# Patient Record
Sex: FEMALE | Race: WHITE | NOT HISPANIC OR LATINO | Employment: FULL TIME | URBAN - METROPOLITAN AREA
[De-identification: names, ages, dates, MRNs, and addresses within clinical notes are randomized per-mention and may not be internally consistent; named-entity substitution may affect disease eponyms.]

---

## 2023-03-09 PROBLEM — E66.811 CLASS 1 OBESITY DUE TO EXCESS CALORIES WITH SERIOUS COMORBIDITY AND BODY MASS INDEX (BMI) OF 34.0 TO 34.9 IN ADULT: Status: ACTIVE | Noted: 2023-03-09

## 2024-04-15 DIAGNOSIS — I10 ESSENTIAL HYPERTENSION: ICD-10-CM

## 2024-04-15 RX ORDER — DILTIAZEM HYDROCHLORIDE 240 MG/1
240 CAPSULE, EXTENDED RELEASE ORAL DAILY
Qty: 90 CAPSULE | Refills: 3 | OUTPATIENT
Start: 2024-04-15

## 2024-04-24 ENCOUNTER — OFFICE VISIT (OUTPATIENT)
Dept: FAMILY MEDICINE CLINIC | Facility: CLINIC | Age: 54
End: 2024-04-24
Payer: COMMERCIAL

## 2024-04-24 VITALS
SYSTOLIC BLOOD PRESSURE: 164 MMHG | BODY MASS INDEX: 33.49 KG/M2 | HEART RATE: 85 BPM | HEIGHT: 64 IN | TEMPERATURE: 96.6 F | RESPIRATION RATE: 16 BRPM | WEIGHT: 196.2 LBS | OXYGEN SATURATION: 97 % | DIASTOLIC BLOOD PRESSURE: 72 MMHG

## 2024-04-24 DIAGNOSIS — Z12.31 ENCOUNTER FOR SCREENING MAMMOGRAM FOR MALIGNANT NEOPLASM OF BREAST: ICD-10-CM

## 2024-04-24 DIAGNOSIS — E55.9 VITAMIN D DEFICIENCY: ICD-10-CM

## 2024-04-24 DIAGNOSIS — F51.01 PRIMARY INSOMNIA: ICD-10-CM

## 2024-04-24 DIAGNOSIS — Z87.891 PERSONAL HISTORY OF TOBACCO USE, PRESENTING HAZARDS TO HEALTH: ICD-10-CM

## 2024-04-24 DIAGNOSIS — I10 ESSENTIAL HYPERTENSION: Primary | ICD-10-CM

## 2024-04-24 DIAGNOSIS — R73.03 PREDIABETES: ICD-10-CM

## 2024-04-24 DIAGNOSIS — K21.9 GASTROESOPHAGEAL REFLUX DISEASE: ICD-10-CM

## 2024-04-24 DIAGNOSIS — E78.2 MIXED HYPERLIPIDEMIA: ICD-10-CM

## 2024-04-24 PROCEDURE — 99214 OFFICE O/P EST MOD 30 MIN: CPT | Performed by: FAMILY MEDICINE

## 2024-04-24 RX ORDER — DEXLANSOPRAZOLE 60 MG/1
1 CAPSULE, DELAYED RELEASE ORAL DAILY
Qty: 90 CAPSULE | Refills: 0 | Status: SHIPPED | OUTPATIENT
Start: 2024-04-24

## 2024-04-24 RX ORDER — DILTIAZEM HYDROCHLORIDE 240 MG/1
240 CAPSULE, EXTENDED RELEASE ORAL DAILY
Qty: 90 CAPSULE | Refills: 0 | Status: SHIPPED | OUTPATIENT
Start: 2024-04-24

## 2024-04-24 RX ORDER — ALBUTEROL SULFATE 90 UG/1
2 AEROSOL, METERED RESPIRATORY (INHALATION) EVERY 6 HOURS PRN
Qty: 54 G | Refills: 0 | Status: SHIPPED | OUTPATIENT
Start: 2024-04-24

## 2024-04-24 RX ORDER — PRAVASTATIN SODIUM 40 MG
40 TABLET ORAL DAILY
Qty: 90 TABLET | Refills: 0 | Status: SHIPPED | OUTPATIENT
Start: 2024-04-24

## 2024-04-24 RX ORDER — MIRTAZAPINE 45 MG/1
45 TABLET, FILM COATED ORAL
Qty: 90 TABLET | Refills: 0 | Status: SHIPPED | OUTPATIENT
Start: 2024-04-24

## 2024-04-24 NOTE — PROGRESS NOTES
Chief Complaint  Med Refill    Subjective          Reema Choe presents to [facility]  History of Present Illness    Right carpal tunnel   Pain management is planning to inject to relieve symptoms.   Chronic back pain improved since getting pain pump implant.     Mammogram is overdue  She is interested in lung cancer screening    Blood pressure is elevated, but she reports normal readings at home.  She has been without Entresto stating that pharmacy does not have refills.  Reviewing her records, Dr. Naylor did approve 1 year supply in February 2024.    The following portions of the patient's history were reviewed and updated as appropriate: allergies, current medications, past family history, past medical history, past social history, past surgical history, and problem list.    Objective      Physical Exam  Vitals reviewed.   Cardiovascular:      Rate and Rhythm: Normal rate.      Heart sounds: Normal heart sounds.   Pulmonary:      Effort: Pulmonary effort is normal.      Breath sounds: Normal breath sounds.   Neurological:      Mental Status: She is alert.        Result Review :                Assessment and Plan    Diagnoses and all orders for this visit:    1. Essential hypertension (Primary)  Comments:  Uncontrolled, but she reports normal home blood pressure readings.  Orders:  -     dilTIAZem XR (Dilt-XR) 240 MG 24 hr capsule; Take 1 capsule by mouth Daily.  Dispense: 90 capsule; Refill: 0  -     Comprehensive Metabolic Panel; Future  -     CBC & Differential; Future  -     Hemoglobin A1c; Future  -     Lipid Panel With / Chol / HDL Ratio; Future  -     TSH; Future  -     Vitamin D,25-Hydroxy; Future    2. Prediabetes  -     CBC & Differential; Future  -     Hemoglobin A1c; Future  -     Lipid Panel With / Chol / HDL Ratio; Future  -     TSH; Future  -     Vitamin D,25-Hydroxy; Future    3. Gastroesophageal reflux disease  Comments:  Chronic condition, has seen  gastroenterology for evaluation.  Continue Dexilant  Orders:  -     dexlansoprazole (Dexilant) 60 MG capsule; Take 1 capsule by mouth Daily.  Dispense: 90 capsule; Refill: 0  -     Comprehensive Metabolic Panel; Future  -     CBC & Differential; Future  -     Hemoglobin A1c; Future  -     Lipid Panel With / Chol / HDL Ratio; Future  -     TSH; Future  -     Vitamin D,25-Hydroxy; Future    4. Primary insomnia  Comments:  Continue mirtazapine.  Orders:  -     mirtazapine (REMERON) 45 MG tablet; Take 1 tablet by mouth every night at bedtime.  Dispense: 90 tablet; Refill: 0  -     Comprehensive Metabolic Panel; Future  -     CBC & Differential; Future  -     Hemoglobin A1c; Future  -     Lipid Panel With / Chol / HDL Ratio; Future  -     TSH; Future  -     Vitamin D,25-Hydroxy; Future    5. Mixed hyperlipidemia  -     pravastatin (PRAVACHOL) 40 MG tablet; Take 1 tablet by mouth Daily.  Dispense: 90 tablet; Refill: 0  -     Comprehensive Metabolic Panel; Future  -     CBC & Differential; Future  -     Hemoglobin A1c; Future  -     Lipid Panel With / Chol / HDL Ratio; Future  -     TSH; Future  -     Vitamin D,25-Hydroxy; Future    6. Vitamin D deficiency  -     Vitamin D,25-Hydroxy; Future    7. Personal history of tobacco use, presenting hazards to health  -     CT Chest Low Dose Wo; Future    8. Encounter for screening mammogram for malignant neoplasm of breast  -     Mammo Screening Digital Tomosynthesis Bilateral With CAD; Future    Other orders  -     albuterol sulfate  (90 Base) MCG/ACT inhaler; Inhale 2 puffs Every 6 (Six) Hours As Needed for Wheezing.  Dispense: 54 g; Refill: 0    Return to complete fasting labs.    Follow Up   Return in about 1 year (around 4/24/2025) for Annual.  Patient was given instructions and counseling regarding her condition or for health maintenance advice. Please see specific information pulled into the AVS if appropriate.

## 2024-04-29 ENCOUNTER — PRIOR AUTHORIZATION (OUTPATIENT)
Dept: FAMILY MEDICINE CLINIC | Facility: CLINIC | Age: 54
End: 2024-04-29
Payer: COMMERCIAL

## 2024-04-29 ENCOUNTER — TELEPHONE (OUTPATIENT)
Dept: FAMILY MEDICINE CLINIC | Facility: CLINIC | Age: 54
End: 2024-04-29
Payer: COMMERCIAL

## 2024-04-29 NOTE — TELEPHONE ENCOUNTER
Caller: [facility] - [place], [place] - [address] - [phone] [facility] [phone]     Relationship: Pharmacy    Which medication are you concerned about: CALLED STATED THAT RX    dexlansoprazole (Dexilant) 60 MG capsule   HAS BEEN DISCONTINUED THERE ARE OTHER COVERED ALTERNATIVES, STATED THAT THEY FAXED OVER ALTERNATIVES      CALL BACK: [phone]  REFERENCE [id number]

## 2024-04-29 NOTE — TELEPHONE ENCOUNTER
She has failed Protonix, Omeprazole based on medication history in her chart. Possibly others, will have to contact her.

## 2024-04-30 RX ORDER — SACUBITRIL AND VALSARTAN 49; 51 MG/1; MG/1
1 TABLET, FILM COATED ORAL 2 TIMES DAILY
Qty: 60 TABLET | Refills: 0 | Status: SHIPPED | OUTPATIENT
Start: 2024-04-30 | End: 2024-05-01 | Stop reason: SDUPTHER

## 2024-05-01 RX ORDER — SACUBITRIL AND VALSARTAN 49; 51 MG/1; MG/1
1 TABLET, FILM COATED ORAL 2 TIMES DAILY
Qty: 180 TABLET | Refills: 0 | Status: SHIPPED | OUTPATIENT
Start: 2024-05-01

## 2024-06-13 DIAGNOSIS — K21.9 GASTROESOPHAGEAL REFLUX DISEASE: ICD-10-CM

## 2024-06-13 DIAGNOSIS — B00.1 HERPES LABIALIS: ICD-10-CM

## 2024-06-13 RX ORDER — DEXLANSOPRAZOLE 60 MG/1
1 CAPSULE, DELAYED RELEASE ORAL DAILY
Qty: 90 CAPSULE | Refills: 0 | OUTPATIENT
Start: 2024-06-13

## 2024-06-13 RX ORDER — VALACYCLOVIR HYDROCHLORIDE 500 MG/1
500 TABLET, FILM COATED ORAL DAILY
Qty: 90 TABLET | Refills: 3 | Status: SHIPPED | OUTPATIENT
Start: 2024-06-13

## 2024-07-05 DIAGNOSIS — F51.01 PRIMARY INSOMNIA: ICD-10-CM

## 2024-07-05 RX ORDER — MIRTAZAPINE 45 MG/1
45 TABLET, FILM COATED ORAL
Qty: 90 TABLET | Refills: 3 | Status: SHIPPED | OUTPATIENT
Start: 2024-07-05

## 2024-07-06 DIAGNOSIS — F41.9 ANXIETY: ICD-10-CM

## 2024-07-08 DIAGNOSIS — F41.9 ANXIETY: ICD-10-CM

## 2024-07-08 RX ORDER — CITALOPRAM 40 MG/1
40 TABLET ORAL DAILY
Qty: 90 TABLET | Refills: 3 | Status: SHIPPED | OUTPATIENT
Start: 2024-07-08 | End: 2024-07-08 | Stop reason: SDUPTHER

## 2024-07-08 RX ORDER — CITALOPRAM 40 MG/1
40 TABLET ORAL DAILY
Qty: 30 TABLET | Refills: 0 | Status: SHIPPED | OUTPATIENT
Start: 2024-07-08

## 2024-07-10 DIAGNOSIS — E78.2 MIXED HYPERLIPIDEMIA: ICD-10-CM

## 2024-07-12 RX ORDER — PRAVASTATIN SODIUM 40 MG
40 TABLET ORAL DAILY
Qty: 30 TABLET | Refills: 0 | Status: SHIPPED | OUTPATIENT
Start: 2024-07-12

## 2024-07-15 DIAGNOSIS — I10 ESSENTIAL HYPERTENSION: ICD-10-CM

## 2024-07-17 RX ORDER — DILTIAZEM HYDROCHLORIDE 240 MG/1
240 CAPSULE, EXTENDED RELEASE ORAL DAILY
Qty: 30 CAPSULE | Refills: 0 | Status: SHIPPED | OUTPATIENT
Start: 2024-07-17

## 2024-07-25 ENCOUNTER — LAB (OUTPATIENT)
Dept: FAMILY MEDICINE CLINIC | Facility: CLINIC | Age: 54
End: 2024-07-25
Payer: COMMERCIAL

## 2024-08-01 DIAGNOSIS — D64.9 ANEMIA, UNSPECIFIED TYPE: Primary | ICD-10-CM

## 2024-08-02 DIAGNOSIS — F41.9 ANXIETY: ICD-10-CM

## 2024-08-02 DIAGNOSIS — E78.2 MIXED HYPERLIPIDEMIA: ICD-10-CM

## 2024-08-02 DIAGNOSIS — I10 ESSENTIAL HYPERTENSION: ICD-10-CM

## 2024-08-02 DIAGNOSIS — K21.9 GASTROESOPHAGEAL REFLUX DISEASE: ICD-10-CM

## 2024-08-02 RX ORDER — PRAVASTATIN SODIUM 40 MG
40 TABLET ORAL DAILY
Qty: 30 TABLET | Refills: 0 | Status: SHIPPED | OUTPATIENT
Start: 2024-08-02

## 2024-08-02 RX ORDER — CITALOPRAM 40 MG/1
40 TABLET ORAL DAILY
Qty: 30 TABLET | Refills: 0 | Status: SHIPPED | OUTPATIENT
Start: 2024-08-02

## 2024-08-02 RX ORDER — DILTIAZEM HYDROCHLORIDE 240 MG/1
240 CAPSULE, EXTENDED RELEASE ORAL DAILY
Qty: 30 CAPSULE | Refills: 0 | Status: SHIPPED | OUTPATIENT
Start: 2024-08-02

## 2024-08-02 RX ORDER — ALBUTEROL SULFATE 90 UG/1
2 AEROSOL, METERED RESPIRATORY (INHALATION) EVERY 6 HOURS PRN
Qty: 54 G | Refills: 0 | Status: SHIPPED | OUTPATIENT
Start: 2024-08-02

## 2024-08-02 RX ORDER — DEXLANSOPRAZOLE 60 MG/1
1 CAPSULE, DELAYED RELEASE ORAL DAILY
Qty: 90 CAPSULE | Refills: 0 | Status: SHIPPED | OUTPATIENT
Start: 2024-08-02

## 2024-08-09 ENCOUNTER — TELEPHONE (OUTPATIENT)
Dept: CARDIOLOGY | Facility: CLINIC | Age: 54
End: 2024-08-09
Payer: COMMERCIAL

## 2024-08-09 NOTE — TELEPHONE ENCOUNTER
Hub staff attempted to follow warm transfer process and was unsuccessful     Caller: Reema Garner    Relationship to patient: Self    Best call back number: [phone]     Patient is needing: LAST SEEN 8/25/22, PN STATES Return in about 1 year (around 8/25/2023).    ANKLES SWELLING, CHEST HEAVINESS   BOTH ACTIVE     APPROX 1 WEEK     UNABLE TO WT CALL TO CLINICAL LINE, PLEASE ADVISE. PT WOULD LIKE AN APPT AS WELL.

## 2024-08-09 NOTE — TELEPHONE ENCOUNTER
Spoke with pt she is following up with her pcp in two weeks to address her anemia , pt instructed to go to the ER is she experiences increased  soa. Verbalized understanding .  notified to schedule follow up appointment. Last seen 8/22 . No further concerns at this time

## 2024-08-14 ENCOUNTER — LAB (OUTPATIENT)
Dept: FAMILY MEDICINE CLINIC | Facility: CLINIC | Age: 54
End: 2024-08-14
Payer: COMMERCIAL

## 2024-08-14 ENCOUNTER — OFFICE VISIT (OUTPATIENT)
Dept: CARDIOLOGY | Facility: CLINIC | Age: 54
End: 2024-08-14
Payer: COMMERCIAL

## 2024-08-14 VITALS
HEART RATE: 86 BPM | DIASTOLIC BLOOD PRESSURE: 68 MMHG | OXYGEN SATURATION: 95 % | WEIGHT: 218.8 LBS | SYSTOLIC BLOOD PRESSURE: 126 MMHG | BODY MASS INDEX: 38.77 KG/M2 | HEIGHT: 63 IN

## 2024-08-14 DIAGNOSIS — I10 PRIMARY HYPERTENSION: ICD-10-CM

## 2024-08-14 DIAGNOSIS — D64.9 ANEMIA, UNSPECIFIED TYPE: ICD-10-CM

## 2024-08-14 DIAGNOSIS — E78.2 MIXED HYPERLIPIDEMIA: ICD-10-CM

## 2024-08-14 DIAGNOSIS — R06.09 DOE (DYSPNEA ON EXERTION): Primary | ICD-10-CM

## 2024-08-14 PROCEDURE — 99214 OFFICE O/P EST MOD 30 MIN: CPT | Performed by: INTERNAL MEDICINE

## 2024-08-14 RX ORDER — ROSUVASTATIN CALCIUM 20 MG/1
20 TABLET, COATED ORAL DAILY
Qty: 90 TABLET | Refills: 3 | Status: SHIPPED | OUTPATIENT
Start: 2024-08-14

## 2024-08-14 RX ORDER — SACUBITRIL AND VALSARTAN 49; 51 MG/1; MG/1
1 TABLET, FILM COATED ORAL 2 TIMES DAILY
Qty: 180 TABLET | Refills: 0 | Status: SHIPPED | OUTPATIENT
Start: 2024-08-14

## 2024-08-14 RX ORDER — ASPIRIN 81 MG/1
81 TABLET ORAL DAILY
COMMUNITY

## 2024-08-14 NOTE — PROGRESS NOTES
[facility]  Consultation H&P  Reema Gasca [facility]  1970  [address]  [place] [address]     VISIT DATE:  08/14/24    PCP: Guerline Alarcon  [address]  [place] [address]    IDENTIFICATION: A 54 y.o.  female work from home     PROBLEM LIST:  Chest pain  7/22 Ex/Card wnl w breast attn EF 70% no coronary calcium  VHD  7/1/19 echo: EF 65%, LVH, LA borderline dilated, mild AI, MAC mild MR, mild TR, RVSP normal  5/22 OSH echo MOD AI EF 60% IR  HLD, on pravastatin  2024 ldl 135-off statin  SVT  ~2013 SVT ablation No recurrences   Former tobacco abuse  Cessation 2016, now vapes 3 mg  Migraines  Hypothyroidism  ARF 6/2019  Hospitalization [facility] Cr 3.2, came down to 1.4  Hx of phenfin use for 18 months  Chronic back pain  Fibromyalgia  OCD/anxiety  GERD  Surgical history:  Hysterectomy  HH repair  Cholecystectomy  Tubal ligation       CC:  Chief Complaint   Patient presents with    Chest Pain     SWOLLEN FEET       Allergies  Allergies   Allergen Reactions    Keflex [Cephalexin] Anaphylaxis       Current Medications    Current Outpatient Medications:     albuterol sulfate  (90 Base) MCG/ACT inhaler, Inhale 2 puffs Every 6 (Six) Hours As Needed for Wheezing., Disp: 54 g, Rfl: 0    aspirin 81 MG EC tablet, Take 1 tablet by mouth Daily., Disp: , Rfl:     citalopram (CeleXA) 40 MG tablet, Take 1 tablet by mouth Daily., Disp: 30 tablet, Rfl: 0    dexlansoprazole (Dexilant) 60 MG capsule, Take 1 capsule by mouth Daily., Disp: 90 capsule, Rfl: 0    dilTIAZem XR (DILACOR XR) 240 MG 24 hr capsule, Take 1 capsule by mouth Daily., Disp: 30 capsule, Rfl: 0    mirtazapine (REMERON) 45 MG tablet, TAKE 1 TABLET EVERY NIGHT AT BEDTIME, Disp: 90 tablet, Rfl: 3    multivitamin with minerals (Multivitamin Adults) tablet tablet, Take 1 tablet by mouth Daily., Disp: 30 tablet, Rfl: 2    Omega-3 Fatty Acids (fish oil) 1000 MG capsule  "capsule, Take 1 capsule by mouth 2 (Two) Times a Day With Meals., Disp: , Rfl:     pain patient supplied pump, by Intrathecal route Continuous. MORPHINE PUMP, Disp: , Rfl:     promethazine (PHENERGAN) 25 MG tablet, Take 0.5-1 tablets by mouth Every 6 (Six) Hours As Needed for Nausea or Vomiting., Disp: 30 tablet, Rfl: 0    sacubitril-valsartan (Entresto) 49-51 MG tablet, Take 1 tablet by mouth 2 (Two) Times a Day., Disp: 180 tablet, Rfl: 0    valACYclovir (VALTREX) 500 MG tablet, Take 1 tablet by mouth Daily., Disp: 90 tablet, Rfl: 3    rosuvastatin (CRESTOR) 20 MG tablet, Take 1 tablet by mouth Daily., Disp: 90 tablet, Rfl: 3    Current Facility-Administered Medications:     cyanocobalamin injection 1,000 mcg, 1,000 mcg, Intramuscular, Q28 Days, Kendra Funes APRN, 1,000 mcg at 09/07/23 1411     History of Present Illness   HPI  Reema Choe is a 54 y.o. year old female with the above mentioned PMH who presents for fu.   She notes chronic chest pressure states she is retaining fluid.  Most recently she was noted anemic as well and she had stopped taking her pravastatin and states that the pill is too chalky and she cannot swallow      Vitals:    08/14/24 1440   BP: 126/68   BP Location: Right arm   Patient Position: Sitting   Pulse: 86   SpO2: 95%   Weight: 99.2 kg (218 lb 12.8 oz)   Height: 160 cm (63\")       Body mass index is 38.76 kg/m².     PHYSICAL EXAMINATION:  Constitutional:       Appearance: Healthy appearance. Not in distress.   Neck:      Vascular: No JVR. JVD normal.   Pulmonary:      Effort: Pulmonary effort is normal.      Breath sounds: Normal breath sounds. No wheezing. No rhonchi. No rales.   Chest:      Chest wall: Not tender to palpatation.   Cardiovascular:      PMI at left midclavicular line. Normal rate. Regular rhythm. Normal S1. Normal S2.       Murmurs: There is a diastolic murmur.      No gallop.  No click. No rub.   Pulses:     Intact distal pulses.   Edema:     Peripheral " edema absent.   Abdominal:      General: Bowel sounds are normal.      Palpations: Abdomen is soft.      Tenderness: There is no abdominal tenderness.   Musculoskeletal: Normal range of motion.         General: No tenderness. Skin:     General: Skin is warm and dry.   Neurological:      General: No focal deficit present.      Mental Status: Alert and oriented to person, place and time.         Diagnostic Data:  Procedures  Lab Results   Component Value Date    CHLPL 216 (H) 07/25/2024    TRIG 148 07/25/2024    HDL 52 07/25/2024     Lab Results   Component Value Date    GLUCOSE 110 (H) 07/25/2024    BUN 15 07/25/2024    CREATININE 0.79 07/25/2024     07/25/2024    K 4.5 07/25/2024     07/25/2024    CO2 32.3 (H) 07/25/2024     Lab Results   Component Value Date    HGBA1C 6.00 (H) 07/25/2024     Lab Results   Component Value Date    WBC 6.30 07/25/2024    HGB 10.2 (L) 07/25/2024    HCT 33.3 (L) 07/25/2024     07/25/2024       ASSESSMENT:   Diagnosis Plan   1. GUPTA (dyspnea on exertion)  Adult Transthoracic Echo Complete W/ Cont if Necessary Per Protocol            PLAN:  Aortic insufficiency will recheck echocardiogram with worsening dyspnea    Hypertension uncontrolled we will continue Entresto to 48/52 formulation with concomitant diltiazem    Mixed dyslipidemia transition statin to rosuvastatin 20    Nicotine cessation counseling avoidance of nicotine products recommended        No ref. provider found, thank you for referring Ms. Choe for evaluation.  I have forwarded my electronically generated recommendations to you for review.  Please do not hesitate to call with any questions.      Elbert Naylor MD, [facility]

## 2024-08-20 ENCOUNTER — HOSPITAL ENCOUNTER (OUTPATIENT)
Dept: CARDIOLOGY | Facility: HOSPITAL | Age: 54
Discharge: HOME | End: 2024-08-20
Admitting: INTERNAL MEDICINE
Payer: COMMERCIAL

## 2024-08-20 VITALS — BODY MASS INDEX: 38.62 KG/M2 | WEIGHT: 218 LBS | HEIGHT: 63 IN

## 2024-08-20 DIAGNOSIS — R06.09 DOE (DYSPNEA ON EXERTION): ICD-10-CM

## 2024-08-20 PROCEDURE — 93306 TTE W/DOPPLER COMPLETE: CPT

## 2024-08-21 ENCOUNTER — OFFICE VISIT (OUTPATIENT)
Dept: BARIATRICS/WEIGHT MGMT | Facility: CLINIC | Age: 54
End: 2024-08-21
Payer: COMMERCIAL

## 2024-08-21 ENCOUNTER — TELEPHONE (OUTPATIENT)
Dept: CARDIOLOGY | Facility: CLINIC | Age: 54
End: 2024-08-21
Payer: COMMERCIAL

## 2024-08-21 VITALS
HEART RATE: 82 BPM | BODY MASS INDEX: 37.05 KG/M2 | DIASTOLIC BLOOD PRESSURE: 74 MMHG | HEIGHT: 64 IN | SYSTOLIC BLOOD PRESSURE: 124 MMHG | WEIGHT: 217 LBS

## 2024-08-21 DIAGNOSIS — F41.9 ANXIETY: ICD-10-CM

## 2024-08-21 DIAGNOSIS — E55.9 VITAMIN D INSUFFICIENCY: ICD-10-CM

## 2024-08-21 DIAGNOSIS — R73.03 PREDIABETES: ICD-10-CM

## 2024-08-21 DIAGNOSIS — E66.09 CLASS 1 OBESITY DUE TO EXCESS CALORIES WITH SERIOUS COMORBIDITY AND BODY MASS INDEX (BMI) OF 34.0 TO 34.9 IN ADULT: Primary | ICD-10-CM

## 2024-08-21 DIAGNOSIS — E78.2 MIXED HYPERLIPIDEMIA: ICD-10-CM

## 2024-08-21 DIAGNOSIS — F33.2 SEVERE EPISODE OF RECURRENT MAJOR DEPRESSIVE DISORDER, WITHOUT PSYCHOTIC FEATURES: ICD-10-CM

## 2024-08-21 PROBLEM — M70.62 TROCHANTERIC BURSITIS OF LEFT HIP: Status: ACTIVE | Noted: 2022-10-25

## 2024-08-21 PROBLEM — M46.1 INFLAMMATION OF SACROILIAC JOINT: Status: RESOLVED | Noted: 2023-06-29 | Resolved: 2024-08-21

## 2024-08-21 PROBLEM — M46.1 INFLAMMATION OF SACROILIAC JOINT: Status: ACTIVE | Noted: 2023-06-29

## 2024-08-21 PROBLEM — M47.812 CERVICAL SPONDYLOSIS WITHOUT MYELOPATHY: Status: ACTIVE | Noted: 2022-10-25

## 2024-08-21 PROBLEM — M70.61 TROCHANTERIC BURSITIS OF RIGHT HIP: Status: ACTIVE | Noted: 2023-05-18

## 2024-08-21 PROBLEM — M70.61 TROCHANTERIC BURSITIS OF RIGHT HIP: Status: RESOLVED | Noted: 2023-05-18 | Resolved: 2024-08-21

## 2024-08-21 PROBLEM — M70.62 TROCHANTERIC BURSITIS OF LEFT HIP: Status: RESOLVED | Noted: 2022-10-25 | Resolved: 2024-08-21

## 2024-08-21 LAB
ASCENDING AORTA: 3 CM
BH CV ECHO MEAS - AI P1/2T: 299.7 MSEC
BH CV ECHO MEAS - AO MAX PG: 14 MMHG
BH CV ECHO MEAS - AO MEAN PG: 8 MMHG
BH CV ECHO MEAS - AO ROOT DIAM: 2.7 CM
BH CV ECHO MEAS - AO V2 MAX: 189 CM/SEC
BH CV ECHO MEAS - AO V2 VTI: 39.5 CM
BH CV ECHO MEAS - AVA(I,D): 1.8 CM2
BH CV ECHO MEAS - EDV(CUBED): 62.6 ML
BH CV ECHO MEAS - EDV(MOD-SP2): 65.7 ML
BH CV ECHO MEAS - EDV(MOD-SP4): 106 ML
BH CV ECHO MEAS - EF(MOD-BP): 67.2 %
BH CV ECHO MEAS - EF(MOD-SP2): 74.1 %
BH CV ECHO MEAS - EF(MOD-SP4): 59.3 %
BH CV ECHO MEAS - ESV(CUBED): 16.4 ML
BH CV ECHO MEAS - ESV(MOD-SP2): 17 ML
BH CV ECHO MEAS - ESV(MOD-SP4): 43.1 ML
BH CV ECHO MEAS - FS: 36 %
BH CV ECHO MEAS - IVS/LVPW: 0.82 CM
BH CV ECHO MEAS - IVSD: 1.18 CM
BH CV ECHO MEAS - LA DIMENSION: 5.3 CM
BH CV ECHO MEAS - LAT PEAK E' VEL: 6 CM/SEC
BH CV ECHO MEAS - LV DIASTOLIC VOL/BSA (35-75): 52.8 CM2
BH CV ECHO MEAS - LV MASS(C)D: 186.3 GRAMS
BH CV ECHO MEAS - LV MAX PG: 6.7 MMHG
BH CV ECHO MEAS - LV MEAN PG: 3.7 MMHG
BH CV ECHO MEAS - LV SYSTOLIC VOL/BSA (12-30): 21.5 CM2
BH CV ECHO MEAS - LV V1 MAX: 129.7 CM/SEC
BH CV ECHO MEAS - LV V1 VTI: 27.8 CM
BH CV ECHO MEAS - LVIDD: 4 CM
BH CV ECHO MEAS - LVIDS: 2.5 CM
BH CV ECHO MEAS - LVOT AREA: 2.5 CM2
BH CV ECHO MEAS - LVOT DIAM: 1.8 CM
BH CV ECHO MEAS - LVPWD: 1.44 CM
BH CV ECHO MEAS - MED PEAK E' VEL: 5 CM/SEC
BH CV ECHO MEAS - MV A MAX VEL: 165.5 CM/SEC
BH CV ECHO MEAS - MV DEC SLOPE: 464.7 CM/SEC2
BH CV ECHO MEAS - MV DEC TIME: 0.42 SEC
BH CV ECHO MEAS - MV E MAX VEL: 145.8 CM/SEC
BH CV ECHO MEAS - MV E/A: 0.88
BH CV ECHO MEAS - MV MAX PG: 14.9 MMHG
BH CV ECHO MEAS - MV MEAN PG: 7.1 MMHG
BH CV ECHO MEAS - MV P1/2T: 106.7 MSEC
BH CV ECHO MEAS - MV V2 VTI: 60.9 CM
BH CV ECHO MEAS - MVA(P1/2T): 2.06 CM2
BH CV ECHO MEAS - MVA(VTI): 1.16 CM2
BH CV ECHO MEAS - PA ACC TIME: 0.1 SEC
BH CV ECHO MEAS - PA V2 MAX: 129.5 CM/SEC
BH CV ECHO MEAS - PAPD(PI EDV): 5 MMHG
BH CV ECHO MEAS - PI END-D VEL: 110.5 CM/SEC
BH CV ECHO MEAS - RAP SYSTOLE: 8 MMHG
BH CV ECHO MEAS - RVSP: 37 MMHG
BH CV ECHO MEAS - SV(LVOT): 70.4 ML
BH CV ECHO MEAS - SV(MOD-SP2): 48.7 ML
BH CV ECHO MEAS - SV(MOD-SP4): 62.9 ML
BH CV ECHO MEAS - SVI(LVOT): 35.1 ML/M2
BH CV ECHO MEAS - SVI(MOD-SP2): 24.3 ML/M2
BH CV ECHO MEAS - SVI(MOD-SP4): 31.4 ML/M2
BH CV ECHO MEAS - TAPSE (>1.6): 2.33 CM
BH CV ECHO MEAS - TR MAX PG: 29 MMHG
BH CV ECHO MEAS - TR MAX VEL: 269 CM/SEC
BH CV ECHO MEASUREMENTS AVERAGE E/E' RATIO: 26.51
BH CV VAS BP LEFT ARM: NORMAL MMHG
BH CV XLRA - RV BASE: 4.5 CM
BH CV XLRA - RV LENGTH: 7.1 CM
BH CV XLRA - RV MID: 3.5 CM
BH CV XLRA - TDI S': 16.6 CM/SEC
IVRT: 58 MS
LEFT ATRIUM VOLUME INDEX: 34.4 ML/M2
LV EF 2D ECHO EST: 67 %

## 2024-08-21 RX ORDER — SEMAGLUTIDE 0.5 MG/.5ML
0.5 INJECTION, SOLUTION SUBCUTANEOUS WEEKLY
Qty: 2 ML | Refills: 0 | Status: SHIPPED | OUTPATIENT
Start: 2024-08-21

## 2024-08-21 RX ORDER — VENLAFAXINE HYDROCHLORIDE 37.5 MG/1
37.5 CAPSULE, EXTENDED RELEASE ORAL DAILY
Qty: 30 CAPSULE | Refills: 2 | Status: SHIPPED | OUTPATIENT
Start: 2024-08-21

## 2024-08-21 RX ORDER — ERGOCALCIFEROL 1.25 MG/1
50000 CAPSULE ORAL WEEKLY
Qty: 12 CAPSULE | Refills: 0 | Status: SHIPPED | OUTPATIENT
Start: 2024-08-21

## 2024-08-21 NOTE — PROGRESS NOTES
[facility]  [address]  [place] [address]     Office Note      Date: 2024  Patient Name: Reema Garner  MRN: [id number]  : 1970  Subjective  Subjective     Chief Complaint  Obesity Management follow-up and weight regain           Reema Garner presents to [facility] for obesity management. she is here to restart the weight loss program after an absence of 11 months. Current medications and medical history updated.     Appetite is poorly controlled.     Current lifestyle  The patient is not using a food journal.    24 hour recall:  6 sprites  1 bag of chips  B: sprite, 10 powdered donuts or 1 bagel with honey butter  S: chips  L: none  D: chicken or meatloaf - cooking 3 nights a week and the rest is eating out  Shopping and planning on Sundays  1-2 nights a week dining out ([employer] or fried chicken strips from [employer]    The patient is not exercising due to pain- even cleaning the house will put her on the couch, with a FITT score of: 0.   Frequency Intensity Time Strength Training   [x]   0, none [x]   0 [x]   0 [x]   0   []   1 (1-2x/week) []   1 (light) []   1 (<10 min) []   1 (1x/week)   []   2 (3-5x/week) []   2 (moderate) []   2 (10-20 min) []   2 (2x/week)   []   3 (daily) []   3 (moderately hard)  []   4 (very hard) []   3 (20-30 min)  []   4 (>30 min) []   3 (3-4x/week)       Hours of sleep per night: 5  The following seem to sabotage weight loss efforts:Lack of time for planning & self, comfort/stress eating, enjoyment of food, skipping meals, eating late, liquid calories such as alcohol, specific cravings like carbohydrates, mindless eating (snacking while working or watching TV), eating too fast, always hungry, boredom eating, convenience food (fast food), portion sizes, too little protein, and poor sleep     Review of Systems    Objective     Restart Weight: 217  Change in  "weight since last visit: +25.2  Body mass index is 37.84 kg/m².    Body composition analysis completed and showed:   %body fat: 49.4  Total fat mass (in lbs):107.2    Measurements (in inches)  Neck: 16.5  Chest: 51  Waist: 52    PHQ-9 Total Score: 19      Vital Signs:   /74 (BP Location: Left arm, Patient Position: Sitting)   Pulse 82   Ht 161.3 cm (63.5\")   Wt 98.4 kg (217 lb)   BMI 37.84 kg/m²       General appears stated age and normal appearance   HEENT Mallampati score III or IV, enlarged neck circumference, PERRLA, EOM intact, conjunctivae normal, and without thyromegaly or thyroid nodules   Chest/lungs Normal rate, Regular rhythm, Breathing is unlabored, and Clear to auscultation bilaterally   Abdomen Protuberant, Central adiposity, and TendernessLUQ   Extremities without edema   Neuro Normal DTRs, Good historian, and No focal deficit   Skin Warm, dry, intact   Psych normal behavior, normal thought content, and normal concentration     Result Review :   The following data was reviewed by: CARLOS Queen on 08/21/2024:  Hospital Outpatient Visit on 08/20/2024   Component Date Value Ref Range Status    BH CV VAS BP LEFT ARM 08/20/2024 114/50  mmHg Final    LVIDd 08/20/2024 4.0  cm Final    LVIDs 08/20/2024 2.5  cm Final    IVSd 08/20/2024 1.18  cm Final    LVPWd 08/20/2024 1.44  cm Final    FS 08/20/2024 36.0  % Final    IVS/LVPW 08/20/2024 0.82  cm Final    ESV(cubed) 08/20/2024 16.4  ml Final    LV Sys Vol (BSA corrected) 08/20/2024 21.5  cm2 Final    EDV(cubed) 08/20/2024 62.6  ml Final    LV Ba Vol (BSA corrected) 08/20/2024 52.8  cm2 Final    LV mass(C)d 08/20/2024 186.3  grams Final    LVOT area 08/20/2024 2.5  cm2 Final    LVOT diam 08/20/2024 1.80  cm Final    EDV(MOD-sp2) 08/20/2024 65.7  ml Final    EDV(MOD-sp4) 08/20/2024 106.0  ml Final    ESV(MOD-sp2) 08/20/2024 17.0  ml Final    ESV(MOD-sp4) 08/20/2024 43.1  ml Final    SV(MOD-sp2) 08/20/2024 48.7  ml Final    SV(MOD-sp4) " 08/20/2024 62.9  ml Final    SVi(MOD-SP2) 08/20/2024 24.3  ml/m2 Final    SVi(MOD-SP4) 08/20/2024 31.4  ml/m2 Final    SVi (LVOT) 08/20/2024 35.1  ml/m2 Final    EF(MOD-sp2) 08/20/2024 74.1  % Final    EF(MOD-sp4) 08/20/2024 59.3  % Final    MV E max enmanuel 08/20/2024 145.8  cm/sec Final    MV A max enmanuel 08/20/2024 165.5  cm/sec Final    MV dec time 08/20/2024 0.42  sec Final    MV E/A 08/20/2024 0.88   Final    LA ESV Index (BP) 08/20/2024 34.4  ml/m2 Final    Med Peak E' Enmanuel 08/20/2024 5.0  cm/sec Final    Lat Peak E' Enmanuel 08/20/2024 6.0  cm/sec Final    TR max enmanuel 08/20/2024 269  cm/sec Final    Avg E/e' ratio 08/20/2024 26.51   Final    SV(LVOT) 08/20/2024 70.4  ml Final    RV Base 08/20/2024 4.5  cm Final    RV Mid 08/20/2024 3.5  cm Final    RV Length 08/20/2024 7.1  cm Final    TAPSE (>1.6) 08/20/2024 2.33  cm Final    RV S' 08/20/2024 16.6  cm/sec Final    LA dimension (2D)  08/20/2024 5.3  cm Final    LV V1 max 08/20/2024 129.7  cm/sec Final    LV V1 max PG 08/20/2024 6.7  mmHg Final    LV V1 mean PG 08/20/2024 3.7  mmHg Final    LV V1 VTI 08/20/2024 27.8  cm Final    Ao pk enmanuel 08/20/2024 189.0  cm/sec Final    Ao max PG 08/20/2024 14  mmHg Final    Ao mean PG 08/20/2024 8  mmHg Final    Ao V2 VTI 08/20/2024 39.5  cm Final    OLGA(I,D) 08/20/2024 1.8  cm2 Final    AI P1/2t 08/20/2024 299.7  msec Final    MV max PG 08/20/2024 14.9  mmHg Final    MV mean PG 08/20/2024 7.1  mmHg Final    MV V2 VTI 08/20/2024 60.9  cm Final    MV P1/2t 08/20/2024 106.7  msec Final    MVA(P1/2t) 08/20/2024 2.06  cm2 Final    MVA(VTI) 08/20/2024 1.16  cm2 Final    MV dec slope 08/20/2024 464.7  cm/sec2 Final    TR max PG 08/20/2024 29  mmHg Final    PA V2 max 08/20/2024 129.5  cm/sec Final    PA acc time 08/20/2024 0.10  sec Final    PI end-d enmanuel 08/20/2024 110.5  cm/sec Final    Ao root diam 08/20/2024 2.7  cm Final    EF(MOD-bp) 08/20/2024 67.2  % Final    Echo EF Estimated 08/20/2024 67.0  % Final    IVRT 08/20/2024 58.0  ms Final     RVSP(TR) 08/20/2024 37  mmHg Final    RAP systole 08/20/2024 8  mmHg Final    PAPd(PI edV) 08/20/2024 5  mmHg Final    Ascending aorta 08/20/2024 3.0  cm Final   Results Encounter on 04/29/2024   Component Date Value Ref Range Status    Glucose 07/25/2024 110 (H)  65 - 99 mg/dL Final    BUN 07/25/2024 15  6 - 20 mg/dL Final    Creatinine 07/25/2024 0.79  0.57 - 1.00 mg/dL Final    EGFR Result 07/25/2024 87.9  >60.0 mL/min/1.73 Final    Comment: GFR Normal >60  Chronic Kidney Disease <60  Kidney Failure <15      BUN/Creatinine Ratio 07/25/2024 19.0  7.0 - 25.0 Final    Sodium 07/25/2024 141  136 - 145 mmol/L Final    Potassium 07/25/2024 4.5  3.5 - 5.2 mmol/L Final    Chloride 07/25/2024 103  98 - 107 mmol/L Final    Total CO2 07/25/2024 32.3 (H)  22.0 - 29.0 mmol/L Final    Calcium 07/25/2024 9.6  8.6 - 10.5 mg/dL Final    Total Protein 07/25/2024 6.3  6.0 - 8.5 g/dL Final    Albumin 07/25/2024 4.2  3.5 - 5.2 g/dL Final    Globulin 07/25/2024 2.1  gm/dL Final    A/G Ratio 07/25/2024 2.0  g/dL Final    Total Bilirubin 07/25/2024 <0.2  0.0 - 1.2 mg/dL Final    Alkaline Phosphatase 07/25/2024 114  39 - 117 U/L Final    AST (SGOT) 07/25/2024 20  1 - 32 U/L Final    ALT (SGPT) 07/25/2024 17  1 - 33 U/L Final    WBC 07/25/2024 6.30  3.40 - 10.80 10*3/mm3 Final    RBC 07/25/2024 4.23  3.77 - 5.28 10*6/mm3 Final    Hemoglobin 07/25/2024 10.2 (L)  12.0 - 15.9 g/dL Final    Hematocrit 07/25/2024 33.3 (L)  34.0 - 46.6 % Final    MCV 07/25/2024 78.7 (L)  79.0 - 97.0 fL Final    MCH 07/25/2024 24.1 (L)  26.6 - 33.0 pg Final    MCHC 07/25/2024 30.6 (L)  31.5 - 35.7 g/dL Final    RDW 07/25/2024 18.2 (H)  12.3 - 15.4 % Final    Platelets 07/25/2024 235  140 - 450 10*3/mm3 Final    Neutrophil Rel % 07/25/2024 55.3  42.7 - 76.0 % Final    Lymphocyte Rel % 07/25/2024 32.4  19.6 - 45.3 % Final    Monocyte Rel % 07/25/2024 8.7  5.0 - 12.0 % Final    Eosinophil Rel % 07/25/2024 2.5  0.3 - 6.2 % Final    Basophil Rel % 07/25/2024 0.5   0.0 - 1.5 % Final    Neutrophils Absolute 07/25/2024 3.48  1.70 - 7.00 10*3/mm3 Final    Lymphocytes Absolute 07/25/2024 2.04  0.70 - 3.10 10*3/mm3 Final    Monocytes Absolute 07/25/2024 0.55  0.10 - 0.90 10*3/mm3 Final    Eosinophils Absolute 07/25/2024 0.16  0.00 - 0.40 10*3/mm3 Final    Basophils Absolute 07/25/2024 0.03  0.00 - 0.20 10*3/mm3 Final    Immature Granulocyte Rel % 07/25/2024 0.6 (H)  0.0 - 0.5 % Final    Immature Grans Absolute 07/25/2024 0.04  0.00 - 0.05 10*3/mm3 Final    nRBC 07/25/2024 0.0  0.0 - 0.2 /100 WBC Final    Hemoglobin A1C 07/25/2024 6.00 (H)  4.80 - 5.60 % Final    Comment: Hemoglobin A1C Ranges:  Increased Risk for Diabetes  5.7% to 6.4%  Diabetes                     >= 6.5%  Diabetic Goal                < 7.0%      Total Cholesterol 07/25/2024 216 (H)  0 - 200 mg/dL Final    Comment: Cholesterol Reference Ranges  (U.S. Department of Health and Human Services ATP III  Classifications)  Desirable          <200 mg/dL  Borderline High    200-239 mg/dL  High Risk          >240 mg/dL  Triglyceride Reference Ranges  (U.S. Department of Health and Human Services ATP III  Classifications)  Normal           <150 mg/dL  Borderline High  150-199 mg/dL  High             200-499 mg/dL  Very High        >500 mg/dL  HDL Reference Ranges  (U.S. Department of Health and Human Services ATP III  Classifications)  Low     <40 mg/dl (major risk factor for CHD)  High    >60 mg/dl ('negative' risk factor for CHD)  LDL Reference Ranges  (U.S. Department of Health and Human Services ATP III  Classifications)  Optimal          <100 mg/dL  Near Optimal     100-129 mg/dL  Borderline High  130-159 mg/dL  High             160-189 mg/dL  Very High        >189 mg/dL      Triglycerides 07/25/2024 148  0 - 150 mg/dL Final    HDL Cholesterol 07/25/2024 52  40 - 60 mg/dL Final    VLDL Cholesterol Christopher 07/25/2024 26  5 - 40 mg/dL Final    LDL Chol Calc ([facility]) 07/25/2024 138 (H)  0 - 100 mg/dL Final    Chol/HDL Ratio  07/25/2024 4.15   Final    TSH 07/25/2024 2.590  0.270 - 4.200 uIU/mL Final    25 Hydroxy, Vitamin D 07/25/2024 24.4 (L)  30.0 - 100.0 ng/ml Final    Comment: Reference Range for Total Vitamin D 25(OH)  Deficiency <20.0 ng/mL  Insufficiency 21-29 ng/mL  Sufficiency  ng/mL  Toxicity >100 ng/ml      Vitamin B-12 07/25/2024 384  211 - 946 pg/mL Final    Results may be falsely increased if patient taking Biotin.    Folate 07/25/2024 5.26  4.78 - 24.20 ng/mL Final    Results may be falsely increased if patient taking Biotin.    Iron 07/25/2024 46  37 - 145 mcg/dL Final    Written Authorization 07/25/2024 Comment   Final    Comment: Written Authorization Received.  Authorization received from WRITTEN REQUEST 07-  Logged by Wes Vazquez                    Assessment / Plan         Diagnoses and all orders for this visit:    1. Class 1 obesity due to excess calories with serious comorbidity and body mass index (BMI) of 34.0 to 34.9 in adult (Primary)  Overview:  Restart (8/21/24): 217lb, BMI 37.84  Goal 150lb    AOM hx:  Wegovy: unable to escalate due to shortage  Topamax: no problems, can't recall taking  Wellbutrin: irritability/agitation years ago  Consider: metformin, zepbound (unsure if on formulary)  Caution: GLP-1 due to hx of pancreatitis (had ronny)  Avoid: sympathomimetic due to hypertension and ^CV risk, hx SVT    PMH adrenal adenoma, pt cancelled endo referral.     Comorbidities: preDM, HLD, GERD, depression, SVT (s/p ablation)    Assessment & Plan:  Patient's (Body mass index is 37.84 kg/m².) indicates that they are morbidly/severely obese (BMI > 40 or > 35 with obesity - related health condition) with health conditions that include hypertension, dyslipidemias, GERD, and prediabetes  . Weight is worsening. BMI  is above average; BMI management plan is completed. We discussed low calorie, low carb based diet program, portion control, increasing exercise, management of depression/anxiety/stress to  control compensatory eating, pharmacologic options including wegovy, and an delgado-based approach such as [employer] Pal or Lose It.    I have instructed the patient to continue with pursuit of medical weight loss as a part of this program. Patient does meet criteria for use of anorectics at this time as BMI >30  and is not at treatment goal.     The current plan for this month includes:   - Start baritastic food journal. Reviewed macronutritent goals:  Alories 5566-0258  Net carbs 100-125g  Protein 84-105g  - Decrease sprite to one in the morning then water the rest of the day  - Replace chips with vegetables (already bought cucumbers and celery)  - Restart wegovy with sample today, will send next dose to the pharmacy. Was not able to escalate previously due to shortage. Start Wegovy. Denies family or personal history of MTC, or MEN 2. Hx of pancreatitis prior to ronny. Discussed common side effects of nausea, diarrhea, vomiting, constipation, stomach pain, headache, fatigue, upset stomach, dizziness, feeling bloated, belching, gas, stomach flu, heartburn.   First injection of Wegovy 0.25mg was administered in the office today LLQ, no complications. Sample pen provided to patient along with content regarding use of the pen, dosing schedule, savings opportunities, and helpful tips.   - Consider topamax once depression is better controlled. Start effexor low dose (also has hot flashes).   - Treatment goal 150lb.        Orders:  -     Semaglutide-Weight Management (Wegovy) 0.5 MG/0.5ML solution auto-injector; Inject 0.5 mL under the skin into the appropriate area as directed 1 (One) Time Per Week.  Dispense: 2 mL; Refill: 0    2. Severe episode of recurrent major depressive disorder, without psychotic features  Assessment & Plan:  Patient's depression is a recurrent episode that is severe without psychosis. Depression is active and worsening.    Plan:   Begin new antidepressant medicine; declines referral for therapy at  this time.   Continue celexa.   Increase physical activity, hobbies, time in nature    Followup at the next regular appointment.     Orders:  -     venlafaxine XR (Effexor XR) 37.5 MG 24 hr capsule; Take 1 capsule by mouth Daily.  Dispense: 30 capsule; Refill: 2    3. Anxiety  -     venlafaxine XR (Effexor XR) 37.5 MG 24 hr capsule; Take 1 capsule by mouth Daily.  Dispense: 30 capsule; Refill: 2    4. Vitamin D insufficiency  Assessment & Plan:  We discussed that adipose tissue sequesters Vitamin D and it often coexists with obesity. Treat with prescription dosing. Reassess in 3 months.         Orders:  -     vitamin D (ERGOCALCIFEROL) 1.25 MG (62049 UT) capsule capsule; Take 1 capsule by mouth 1 (One) Time Per Week.  Dispense: 12 capsule; Refill: 0    5. Prediabetes  Overview:  02/2023 A1c 6.0    Assessment & Plan:  Unchanged, A1c 6.0. Denies hypoglycemia. Continue low carb diet, regular physical activity, and weight reduction of 10-15%. Start wegovy. Consider metformin.         6. Mixed hyperlipidemia  Assessment & Plan:   Managed by cardiology.   The 10-year ASCVD risk score (Alverto DK, et al., 2019) is: 3.1%    Values used to calculate the score:      Age: 54 years      Sex: Female      Is Non- : No      Diabetic: No      Tobacco smoker: No      Systolic Blood Pressure: 124 mmHg      Is BP treated: Yes      HDL Cholesterol: 52 mg/dL      Total Cholesterol: 216 mg/dL  Reviewed healthy diet and exercise goals.           I spent 56 minutes caring for Reema on this date of service. This time includes time spent by me in the following activities:preparing for the visit, reviewing tests, performing a medically appropriate examination and/or evaluation , counseling and educating the patient/family/caregiver, ordering medications, tests, or procedures, and documenting information in the medical record  Follow Up   Return in about 1 month (around 9/21/2024) for Next scheduled follow  up.  Patient was given instructions and counseling regarding her condition or for health maintenance advice. Please see specific information pulled into the AVS if appropriate.     CARLOS Muniz

## 2024-08-21 NOTE — ASSESSMENT & PLAN NOTE
Managed by cardiology.   The 10-year ASCVD risk score (Alverto KINSEY, et al., 2019) is: 3.1%    Values used to calculate the score:      Age: 54 years      Sex: Female      Is Non- : No      Diabetic: No      Tobacco smoker: No      Systolic Blood Pressure: 124 mmHg      Is BP treated: Yes      HDL Cholesterol: 52 mg/dL      Total Cholesterol: 216 mg/dL  Reviewed healthy diet and exercise goals.

## 2024-08-21 NOTE — ASSESSMENT & PLAN NOTE
We discussed that adipose tissue sequesters Vitamin D and it often coexists with obesity. Treat with prescription dosing. Reassess in 3 months.

## 2024-08-21 NOTE — ASSESSMENT & PLAN NOTE
Presents today requesting shingles vaccine #1, tolerated well   Unchanged, A1c 6.0. Denies hypoglycemia. Continue low carb diet, regular physical activity, and weight reduction of 10-15%. Start wegovy. Consider metformin.

## 2024-08-21 NOTE — ASSESSMENT & PLAN NOTE
Patient's depression is a recurrent episode that is severe without psychosis. Depression is active and worsening.    Plan:   Begin new antidepressant medicine; declines referral for therapy at this time.   Continue celexa.   Increase physical activity, hobbies, time in nature    Followup at the next regular appointment.

## 2024-08-21 NOTE — TELEPHONE ENCOUNTER
Echo nl lvef  AV/MV valvular calcification with mild restriction    Yearly echo and cont meds      Left message with YASMEEN regarding pts retention of fluid , approx 10 lbs over past 2 months.

## 2024-08-21 NOTE — ASSESSMENT & PLAN NOTE
Patient's (Body mass index is 37.84 kg/m².) indicates that they are morbidly/severely obese (BMI > 40 or > 35 with obesity - related health condition) with health conditions that include hypertension, dyslipidemias, GERD, and prediabetes  . Weight is worsening. BMI  is above average; BMI management plan is completed. We discussed low calorie, low carb based diet program, portion control, increasing exercise, management of depression/anxiety/stress to control compensatory eating, pharmacologic options including wegovy, and an delgado-based approach such as [email or web address] Pal or Lose It.    I have instructed the patient to continue with pursuit of medical weight loss as a part of this program. Patient does meet criteria for use of anorectics at this time as BMI >30  and is not at treatment goal.     The current plan for this month includes:   - Start baritastic food journal. Reviewed macronutritent goals:  Alories 6310-2352  Net carbs 100-125g  Protein 84-105g  - Decrease sprite to one in the morning then water the rest of the day  - Replace chips with vegetables (already bought cucumbers and celery)  - Restart wegovy with sample today, will send next dose to the pharmacy. Was not able to escalate previously due to shortage. Start Wegovy. Denies family or personal history of MTC, or MEN 2. Hx of pancreatitis prior to ronny. Discussed common side effects of nausea, diarrhea, vomiting, constipation, stomach pain, headache, fatigue, upset stomach, dizziness, feeling bloated, belching, gas, stomach flu, heartburn.   First injection of Wegovy 0.25mg was administered in the office today LLQ, no complications. Sample pen provided to patient along with content regarding use of the pen, dosing schedule, savings opportunities, and helpful tips.   - Consider topamax once depression is better controlled. Start effexor low dose (also has hot flashes).   - Treatment goal 150lb.

## 2024-08-22 ENCOUNTER — TELEPHONE (OUTPATIENT)
Dept: CARDIOLOGY | Facility: CLINIC | Age: 54
End: 2024-08-22
Payer: COMMERCIAL

## 2024-08-22 NOTE — TELEPHONE ENCOUNTER
Elbert Naylor MD Jackson, Kristina, RN  Nicotine, anemia.  Needs to fu w pcp re anemia as will result in edema    Pt called , encouraged to follow up with pcp , verbalized understanding

## 2024-09-11 DIAGNOSIS — I10 ESSENTIAL HYPERTENSION: ICD-10-CM

## 2024-09-11 RX ORDER — DILTIAZEM HYDROCHLORIDE 240 MG/1
240 CAPSULE, EXTENDED RELEASE ORAL DAILY
Qty: 90 CAPSULE | Refills: 1 | Status: SHIPPED | OUTPATIENT
Start: 2024-09-11

## 2024-09-15 DIAGNOSIS — E66.09 CLASS 1 OBESITY DUE TO EXCESS CALORIES WITH SERIOUS COMORBIDITY AND BODY MASS INDEX (BMI) OF 33.0 TO 33.9 IN ADULT: ICD-10-CM

## 2024-09-16 RX ORDER — SEMAGLUTIDE 1 MG/.5ML
INJECTION, SOLUTION SUBCUTANEOUS
Qty: 2 ML | Refills: 0 | Status: SHIPPED | OUTPATIENT
Start: 2024-09-16

## 2024-09-23 ENCOUNTER — PRIOR AUTHORIZATION (OUTPATIENT)
Dept: BARIATRICS/WEIGHT MGMT | Facility: CLINIC | Age: 54
End: 2024-09-23
Payer: COMMERCIAL

## 2024-09-26 ENCOUNTER — OFFICE VISIT (OUTPATIENT)
Dept: BARIATRICS/WEIGHT MGMT | Facility: CLINIC | Age: 54
End: 2024-09-26
Payer: COMMERCIAL

## 2024-09-26 VITALS
SYSTOLIC BLOOD PRESSURE: 118 MMHG | BODY MASS INDEX: 33.9 KG/M2 | DIASTOLIC BLOOD PRESSURE: 78 MMHG | WEIGHT: 198.6 LBS | HEIGHT: 64 IN | HEART RATE: 98 BPM

## 2024-09-26 DIAGNOSIS — R73.03 PREDIABETES: ICD-10-CM

## 2024-09-26 DIAGNOSIS — F41.9 ANXIETY: ICD-10-CM

## 2024-09-26 DIAGNOSIS — R11.0 NAUSEA: ICD-10-CM

## 2024-09-26 DIAGNOSIS — E66.09 CLASS 1 OBESITY DUE TO EXCESS CALORIES WITH SERIOUS COMORBIDITY AND BODY MASS INDEX (BMI) OF 34.0 TO 34.9 IN ADULT: Primary | ICD-10-CM

## 2024-09-26 PROCEDURE — 99214 OFFICE O/P EST MOD 30 MIN: CPT | Performed by: NURSE PRACTITIONER

## 2024-09-26 RX ORDER — CITALOPRAM HYDROBROMIDE 40 MG/1
20 TABLET ORAL DAILY
Qty: 30 TABLET | Refills: 0 | Status: SHIPPED | OUTPATIENT
Start: 2024-09-26

## 2024-09-26 RX ORDER — PROMETHAZINE HYDROCHLORIDE 25 MG/1
12.5-25 TABLET ORAL EVERY 6 HOURS PRN
Qty: 30 TABLET | Refills: 0 | Status: SHIPPED | OUTPATIENT
Start: 2024-09-26

## 2024-09-26 RX ORDER — VENLAFAXINE HYDROCHLORIDE 75 MG/1
75 CAPSULE, EXTENDED RELEASE ORAL DAILY
Qty: 30 CAPSULE | Refills: 2 | Status: SHIPPED | OUTPATIENT
Start: 2024-09-26

## 2024-09-26 RX ORDER — SEMAGLUTIDE 0.5 MG/.5ML
0.5 INJECTION, SOLUTION SUBCUTANEOUS WEEKLY
Qty: 2 ML | Refills: 0 | Status: SHIPPED | OUTPATIENT
Start: 2024-09-26

## 2024-11-13 ENCOUNTER — TELEMEDICINE (OUTPATIENT)
Dept: BARIATRICS/WEIGHT MGMT | Facility: CLINIC | Age: 54
End: 2024-11-13
Payer: COMMERCIAL

## 2024-11-13 VITALS — HEIGHT: 64 IN | BODY MASS INDEX: 33.8 KG/M2 | WEIGHT: 198 LBS

## 2024-11-13 DIAGNOSIS — E66.811 CLASS 1 OBESITY DUE TO EXCESS CALORIES WITH SERIOUS COMORBIDITY AND BODY MASS INDEX (BMI) OF 34.0 TO 34.9 IN ADULT: Primary | ICD-10-CM

## 2024-11-13 DIAGNOSIS — F33.2 SEVERE EPISODE OF RECURRENT MAJOR DEPRESSIVE DISORDER, WITHOUT PSYCHOTIC FEATURES: ICD-10-CM

## 2024-11-13 DIAGNOSIS — E66.09 CLASS 1 OBESITY DUE TO EXCESS CALORIES WITH SERIOUS COMORBIDITY AND BODY MASS INDEX (BMI) OF 34.0 TO 34.9 IN ADULT: Primary | ICD-10-CM

## 2024-11-13 DIAGNOSIS — R11.0 NAUSEA: ICD-10-CM

## 2024-11-13 PROCEDURE — 99214 OFFICE O/P EST MOD 30 MIN: CPT | Performed by: NURSE PRACTITIONER

## 2024-11-13 RX ORDER — PROMETHAZINE HYDROCHLORIDE 25 MG/1
12.5-25 TABLET ORAL EVERY 6 HOURS PRN
Qty: 30 TABLET | Refills: 0 | Status: SHIPPED | OUTPATIENT
Start: 2024-11-13

## 2024-11-13 RX ORDER — VENLAFAXINE HYDROCHLORIDE 150 MG/1
150 CAPSULE, EXTENDED RELEASE ORAL DAILY
Qty: 30 CAPSULE | Refills: 1 | Status: SHIPPED | OUTPATIENT
Start: 2024-11-13

## 2024-11-13 RX ORDER — SEMAGLUTIDE 1.7 MG/.75ML
1.7 INJECTION, SOLUTION SUBCUTANEOUS WEEKLY
Qty: 3 ML | Refills: 2 | Status: SHIPPED | OUTPATIENT
Start: 2024-11-13

## 2024-11-13 RX ORDER — TOPIRAMATE 25 MG/1
TABLET, FILM COATED ORAL
Qty: 60 TABLET | Refills: 1 | Status: SHIPPED | OUTPATIENT
Start: 2024-11-13

## 2024-11-13 NOTE — ASSESSMENT & PLAN NOTE
Patient's depression is a recurrent episode that is severe without psychosis. Depression is active and stable. Denies suicidal or homicidal ideation.     Plan:   Medication  dose was adjusted;  increase physical activity.     Followup at the next regular appointment.

## 2024-11-13 NOTE — ASSESSMENT & PLAN NOTE
Side effect of wegovy onset about 8 hours after dose and lasts for 1 day then subsides. Cont phenergan PRN (not for daily use).

## 2024-11-13 NOTE — PROGRESS NOTES
"     Office Note      Date: 2024  Patient Name: Reema Garner  MRN: [id number]  : 1970     Mode of Visit: Video  Location of patient: -HOME-  Location of provider: +[facility]+  You have chosen to receive care through a telehealth visit.  The patient has signed the video visit consent form.  The visit included audio and video interaction. No technical issues occurred during this visit.    Subjective  Subjective     Chief Complaint  Obesity Management follow-up    Subjective          Reema Garner presents to [facility] via telehealth for obesity management.     Patient is unsatisfied with weight loss progress. Appetite is poorly controlled. Craving sweets after dinner. Reports no side effects of prescribed medications today. Has completed first two wegovy 1mg doses. Feels nauseated the first day, phenergan helps. Mobility exercises, chair leg and arm exercises due to bad back.  States she has been working long hours, will slow down in about 2 weeks.   24 hour recall:  Breakfast: none  Lunch: 2 hot dogs   Dinner: grilled chicken, broccoli  Snack:chips, pickles  Water: 60-90 oz  Other Beverages:1 Sprite  Alcohol: none    Review of Systems   Constitutional:  Negative for appetite change and fatigue.   Eyes:  Negative for visual disturbance.   Cardiovascular:  Negative for chest pain and palpitations.   Gastrointestinal:  Negative for constipation and indigestion.   Neurological:  Negative for light-headedness.         Objective   Start weight: 195 pounds.    Total weight loss: +3 pounds  Change in weight since last visit: 0    Body mass index is 34.52 kg/m².   Measurements (in inches)     Ht 161.3 cm (63.5\")   Wt 89.8 kg (198 lb)   BMI 34.52 kg/m²       Result Review :                 Assessment and Plan    Diagnoses and all orders for this visit:    1. Class 1 obesity due to excess calories with serious comorbidity and body mass index " (BMI) of 34.0 to 34.9 in adult (Primary)  Overview:  Restart (8/21/24): 217lb, BMI 37.84  Goal 150lb    AOM hx:  Wegovy: unable to escalate due to shortage  Topamax: no problems, can't recall taking  Wellbutrin: irritability/agitation years ago  Consider: metformin, zepbound (unsure if on formulary)  Caution: GLP-1 due to hx of pancreatitis (had ronny)  Avoid: sympathomimetic due to hypertension and ^CV risk, hx SVT    Calories: 800-1000  Protein 84-105g  Net Carbs: 50-75g  Protein RDA: 52g  Water: 100oz    PMH adrenal adenoma, pt cancelled endo referral.     Comorbidities: preDM, HLD, GERD, depression, SVT (s/p ablation)    Assessment & Plan:  Patient's (Body mass index is 34.52 kg/m².) indicates that they are obese (BMI >30) with health conditions that include impaired fasting glucose, dyslipidemias, and GERD . Weight is worsening. BMI  is above average; BMI management plan is completed. We discussed low calorie, low carb based diet program, portion control, increasing exercise, management of depression/anxiety/stress to control compensatory eating, pharmacologic options including wegovy, topamax, and an delgado-based approach such as [employer] Pal or Lose It.     I have instructed the patient to continue with pursuit of medical weight loss as a part of this program. Patient does meet criteria for use of anorectics at this time as BMI >30  and is not at treatment goal.     The current plan for this month includes:   - Continue to work on lifestyle behavioral changes  - Prioritize protein, fiber, and hydration.   - Restart topamax for sweets cravings, practice abstinence from highly addictive foods  - Has completed 2 weeks of wegovy 1mg, increase to 1.7mg when those are completed.  - Treatment goal 150lb      Orders:  -     Semaglutide-Weight Management (Wegovy) 1.7 MG/0.75ML solution auto-injector; Inject 0.75 mL under the skin into the appropriate area as directed 1 (One) Time Per Week.  Dispense: 3 mL; Refill: 2  -      topiramate (Topamax) 25 MG tablet; Take one tablet by mouth daily at bedtime for a week then increase to 2 tablets by mouth daily at bedtime  Dispense: 60 tablet; Refill: 1  -     venlafaxine XR (EFFEXOR-XR) 150 MG 24 hr capsule; Take 1 capsule by mouth Daily.  Dispense: 30 capsule; Refill: 1    2. Severe episode of recurrent major depressive disorder, without psychotic features  Assessment & Plan:  Patient's depression is a recurrent episode that is severe without psychosis. Depression is active and stable. Denies suicidal or homicidal ideation.     Plan:   Medication  dose was adjusted;  increase physical activity.     Followup at the next regular appointment.       3. Nausea  Assessment & Plan:  Side effect of wegovy onset about 8 hours after dose and lasts for 1 day then subsides. Cont phenergan PRN (not for daily use).     Orders:  -     promethazine (PHENERGAN) 25 MG tablet; Take 0.5-1 tablets by mouth Every 6 (Six) Hours As Needed for Nausea or Vomiting.  Dispense: 30 tablet; Refill: 0          Follow Up   Return in about 1 month (around 12/13/2024) for Next scheduled follow up.  Patient was given instructions and counseling regarding her condition or for health maintenance advice. Please see specific information pulled into the AVS if appropriate.     CARLOS Muniz

## 2024-11-13 NOTE — ASSESSMENT & PLAN NOTE
Patient's (Body mass index is 34.52 kg/m².) indicates that they are obese (BMI >30) with health conditions that include impaired fasting glucose, dyslipidemias, and GERD . Weight is worsening. BMI  is above average; BMI management plan is completed. We discussed low calorie, low carb based diet program, portion control, increasing exercise, management of depression/anxiety/stress to control compensatory eating, pharmacologic options including wegovy, topamax, and an delgado-based approach such as [place] Pal or Lose It.     I have instructed the patient to continue with pursuit of medical weight loss as a part of this program. Patient does meet criteria for use of anorectics at this time as BMI >30  and is not at treatment goal.     The current plan for this month includes:   - Continue to work on lifestyle behavioral changes  - Prioritize protein, fiber, and hydration.   - Restart topamax for sweets cravings, practice abstinence from highly addictive foods  - Has completed 2 weeks of wegovy 1mg, increase to 1.7mg when those are completed.  - Treatment goal 150lb

## 2025-01-12 DIAGNOSIS — E66.811 CLASS 1 OBESITY DUE TO EXCESS CALORIES WITH SERIOUS COMORBIDITY AND BODY MASS INDEX (BMI) OF 34.0 TO 34.9 IN ADULT: ICD-10-CM

## 2025-01-12 DIAGNOSIS — E66.09 CLASS 1 OBESITY DUE TO EXCESS CALORIES WITH SERIOUS COMORBIDITY AND BODY MASS INDEX (BMI) OF 34.0 TO 34.9 IN ADULT: ICD-10-CM

## 2025-01-13 RX ORDER — TOPIRAMATE 25 MG/1
50 TABLET, FILM COATED ORAL
Qty: 60 TABLET | Refills: 0 | Status: SHIPPED | OUTPATIENT
Start: 2025-01-13

## 2025-01-13 RX ORDER — VENLAFAXINE HYDROCHLORIDE 150 MG/1
150 CAPSULE, EXTENDED RELEASE ORAL DAILY
Qty: 30 CAPSULE | Refills: 0 | Status: SHIPPED | OUTPATIENT
Start: 2025-01-13

## 2025-01-20 RX ORDER — SACUBITRIL AND VALSARTAN 49; 51 MG/1; MG/1
1 TABLET, FILM COATED ORAL 2 TIMES DAILY
Qty: 180 TABLET | Refills: 3 | Status: SHIPPED | OUTPATIENT
Start: 2025-01-20

## 2025-01-23 DIAGNOSIS — E66.811 CLASS 1 OBESITY DUE TO EXCESS CALORIES WITH SERIOUS COMORBIDITY AND BODY MASS INDEX (BMI) OF 34.0 TO 34.9 IN ADULT: ICD-10-CM

## 2025-01-23 DIAGNOSIS — E66.09 CLASS 1 OBESITY DUE TO EXCESS CALORIES WITH SERIOUS COMORBIDITY AND BODY MASS INDEX (BMI) OF 34.0 TO 34.9 IN ADULT: ICD-10-CM

## 2025-01-23 RX ORDER — VENLAFAXINE HYDROCHLORIDE 150 MG/1
150 CAPSULE, EXTENDED RELEASE ORAL DAILY
Qty: 30 CAPSULE | Refills: 0 | OUTPATIENT
Start: 2025-01-23

## 2025-02-20 DIAGNOSIS — I10 ESSENTIAL HYPERTENSION: ICD-10-CM

## 2025-02-20 RX ORDER — DILTIAZEM HYDROCHLORIDE 240 MG/1
240 CAPSULE, EXTENDED RELEASE ORAL DAILY
Qty: 90 CAPSULE | Refills: 0 | Status: SHIPPED | OUTPATIENT
Start: 2025-02-20

## 2025-03-11 DIAGNOSIS — K21.9 GASTROESOPHAGEAL REFLUX DISEASE: ICD-10-CM

## 2025-03-11 DIAGNOSIS — B00.1 HERPES LABIALIS: ICD-10-CM

## 2025-03-11 RX ORDER — DEXLANSOPRAZOLE 60 MG/1
1 CAPSULE, DELAYED RELEASE ORAL DAILY
Qty: 90 CAPSULE | Refills: 0 | Status: SHIPPED | OUTPATIENT
Start: 2025-03-11

## 2025-03-11 RX ORDER — VALACYCLOVIR HYDROCHLORIDE 500 MG/1
500 TABLET, FILM COATED ORAL DAILY
Qty: 90 TABLET | Refills: 3 | OUTPATIENT
Start: 2025-03-11

## 2025-03-13 DIAGNOSIS — E66.09 CLASS 1 OBESITY DUE TO EXCESS CALORIES WITH SERIOUS COMORBIDITY AND BODY MASS INDEX (BMI) OF 34.0 TO 34.9 IN ADULT: ICD-10-CM

## 2025-03-13 DIAGNOSIS — E66.811 CLASS 1 OBESITY DUE TO EXCESS CALORIES WITH SERIOUS COMORBIDITY AND BODY MASS INDEX (BMI) OF 34.0 TO 34.9 IN ADULT: ICD-10-CM

## 2025-03-13 RX ORDER — VENLAFAXINE HYDROCHLORIDE 150 MG/1
150 CAPSULE, EXTENDED RELEASE ORAL DAILY
Qty: 30 CAPSULE | Refills: 0 | Status: SHIPPED | OUTPATIENT
Start: 2025-03-13 | End: 2025-03-21 | Stop reason: SDUPTHER

## 2025-03-13 NOTE — TELEPHONE ENCOUNTER
I will approve one more refill to prevent discontination side effects but she will either need to follow up with me for further refills or request this medication from her PCP.

## 2025-03-13 NOTE — TELEPHONE ENCOUNTER
Rx Refill Note  Requested Prescriptions     Pending Prescriptions Disp Refills    venlafaxine XR (EFFEXOR-XR) 150 MG 24 hr capsule [Pharmacy Med Name: Venlafaxine HCl  MG Oral Capsule Extended Release 24 Hour] 30 capsule 0     Sig: Take 1 capsule by mouth once daily      Last office visit with prescribing clinician: 9/26/2024   Last telemedicine visit with prescribing clinician: 11/13/2024   Next office visit with prescribing clinician: Visit date not found                         Would you like a call back once the refill request has been completed: [] Yes [] No    If the office needs to give you a call back, can they leave a voicemail: [] Yes [] No    Neal Obrien MA  03/13/25, 08:00 EDT

## 2025-03-19 RX ORDER — ROSUVASTATIN CALCIUM 20 MG/1
20 TABLET, COATED ORAL DAILY
Qty: 90 TABLET | Refills: 3 | Status: SHIPPED | OUTPATIENT
Start: 2025-03-19

## 2025-03-19 NOTE — TELEPHONE ENCOUNTER
Lab Results   Component Value Date    CHLPL 216 (H) 07/25/2024    TRIG 148 07/25/2024    HDL 52 07/25/2024     (H) 07/25/2024     Lab Results   Component Value Date    GLUCOSE 110 (H) 07/25/2024    CALCIUM 9.6 07/25/2024     07/25/2024    K 4.5 07/25/2024    CO2 32.3 (H) 07/25/2024     07/25/2024    BUN 15 07/25/2024    CREATININE 0.79 07/25/2024    EGFR 87.9 07/25/2024    BCR 19.0 07/25/2024    ANIONGAP 10.0 09/29/2020

## 2025-03-21 DIAGNOSIS — E66.09 CLASS 1 OBESITY DUE TO EXCESS CALORIES WITH SERIOUS COMORBIDITY AND BODY MASS INDEX (BMI) OF 34.0 TO 34.9 IN ADULT: ICD-10-CM

## 2025-03-21 DIAGNOSIS — E66.811 CLASS 1 OBESITY DUE TO EXCESS CALORIES WITH SERIOUS COMORBIDITY AND BODY MASS INDEX (BMI) OF 34.0 TO 34.9 IN ADULT: ICD-10-CM

## 2025-03-21 RX ORDER — VENLAFAXINE HYDROCHLORIDE 150 MG/1
150 CAPSULE, EXTENDED RELEASE ORAL DAILY
Qty: 30 CAPSULE | Refills: 0 | Status: SHIPPED | OUTPATIENT
Start: 2025-03-21 | End: 2025-03-21 | Stop reason: SDUPTHER

## 2025-03-21 RX ORDER — VENLAFAXINE HYDROCHLORIDE 150 MG/1
150 CAPSULE, EXTENDED RELEASE ORAL DAILY
Qty: 90 CAPSULE | Refills: 0 | Status: SHIPPED | OUTPATIENT
Start: 2025-03-21

## 2025-04-01 DIAGNOSIS — B00.1 HERPES LABIALIS: ICD-10-CM

## 2025-04-02 RX ORDER — VALACYCLOVIR HYDROCHLORIDE 500 MG/1
500 TABLET, FILM COATED ORAL DAILY
Qty: 90 TABLET | Refills: 3 | Status: SHIPPED | OUTPATIENT
Start: 2025-04-02

## 2025-04-15 DIAGNOSIS — E66.09 CLASS 1 OBESITY DUE TO EXCESS CALORIES WITH SERIOUS COMORBIDITY AND BODY MASS INDEX (BMI) OF 34.0 TO 34.9 IN ADULT: ICD-10-CM

## 2025-04-15 DIAGNOSIS — E66.811 CLASS 1 OBESITY DUE TO EXCESS CALORIES WITH SERIOUS COMORBIDITY AND BODY MASS INDEX (BMI) OF 34.0 TO 34.9 IN ADULT: ICD-10-CM

## 2025-04-15 RX ORDER — VENLAFAXINE HYDROCHLORIDE 150 MG/1
150 CAPSULE, EXTENDED RELEASE ORAL DAILY
Qty: 30 CAPSULE | Refills: 0 | OUTPATIENT
Start: 2025-04-15

## 2025-04-17 ENCOUNTER — CLINICAL SUPPORT (OUTPATIENT)
Dept: CARDIOLOGY | Facility: CLINIC | Age: 55
End: 2025-04-17
Payer: COMMERCIAL

## 2025-04-17 ENCOUNTER — TELEPHONE (OUTPATIENT)
Dept: CARDIOLOGY | Facility: CLINIC | Age: 55
End: 2025-04-17
Payer: COMMERCIAL

## 2025-04-17 DIAGNOSIS — R00.2 PALPITATIONS: Primary | ICD-10-CM

## 2025-04-17 DIAGNOSIS — I48.91 ATRIAL FIBRILLATION, UNSPECIFIED TYPE: Primary | ICD-10-CM
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
## 2025-04-28 ENCOUNTER — READMISSION MANAGEMENT (OUTPATIENT)
Dept: CALL CENTER | Facility: HOSPITAL | Age: 55
End: 2025-04-28
Payer: COMMERCIAL

## 2025-04-28 NOTE — OUTREACH NOTE
Prep Survey      Flowsheet Row Responses   [facility] patient discharged from? Non-[facility]   Is LACE score < 7 ? Non-BH Discharge   Eligibility [facility]   Date of Admission 04/20/25   Date of Discharge 04/28/25   Discharge Disposition Home or Self Care   Discharge diagnosis Atrial flutter/CHF   Does the patient have one of the following disease processes/diagnoses(primary or secondary)? CHF   Does the patient have Home health ordered? No   Prep survey completed? Yes            EJ MONTEJO - Registered Nurse

## 2025-04-29 ENCOUNTER — TRANSITIONAL CARE MANAGEMENT TELEPHONE ENCOUNTER (OUTPATIENT)
Dept: CALL CENTER | Facility: HOSPITAL | Age: 55
End: 2025-04-29
Payer: COMMERCIAL

## 2025-04-29 NOTE — OUTREACH NOTE
Call Center TCM Note      Flowsheet Row Responses   [facility] patient discharged from? Non-   Does the patient have one of the following disease processes/diagnoses(primary or secondary)? CHF   TCM attempt successful? Yes   Call start time 1559   Call end time 1602   Discharge diagnosis Atrial flutter/CHF   Person spoke with today (if not patient) and relationship Patient   Meds reviewed with patient/caregiver? Yes   Is the patient having any side effects they believe may be caused by any medication additions or changes? No   Does the patient have all medications ordered at discharge? Yes   Is the patient taking all medications as directed (includes completed medication regime)? Yes   Comments Patient has numerous appts scheduled at  and declines scheduling at present time.   Does the patient have an appointment with their PCP within 7-14 days of discharge? Other   Nursing Interventions Patient declined scheduling/rescheduling appointment at this time, Patient desires to follow up with specialty only   Psychosocial issues? No   Did the patient receive a copy of their discharge instructions? Yes   Nursing interventions Reviewed instructions with patient   What is the patient's perception of their health status since discharge? Improving   Is the patient/caregiver able to teach back signs and symptoms related to disease process for when to call PCP? Yes   Is the patient/caregiver able to teach back signs and symptoms related to disease process for when to call 911? Yes   Is the patient/caregiver able to teach back the hierarchy of who to call/visit for symptoms/problems? PCP, Specialist, Home health nurse, Urgent Care, ED, 911 Yes   If the patient is a current smoker, are they able to teach back resources for cessation? Not a smoker   TCM call completed? Yes   Wrap up additional comments Patient was discharged from . She has come home requiring 02 at 2 LBNC. She has a pulse ox and will monitor her oxygen  levels.   Call end time 1602   Would this patient benefit from a Referral to [facility] Social Work? No   Is the patient interested in additional calls from an ambulatory ? No            Giacomo MARTINES - Registered Nurse    4/29/2025, 16:03 EDT

## 2025-05-08 ENCOUNTER — READMISSION MANAGEMENT (OUTPATIENT)
Dept: CALL CENTER | Facility: HOSPITAL | Age: 55
End: 2025-05-08
Payer: COMMERCIAL

## 2025-05-08 NOTE — OUTREACH NOTE
CHF Week 2 Survey      Flowsheet Row Responses   [facility] patient discharged from? Non-   Does the patient have one of the following disease processes/diagnoses(primary or secondary)? CHF   Week 2 attempt successful? Yes   Call start time 1424   Call end time 1426   Discharge diagnosis Atrial flutter/CHF   Meds reviewed with patient/caregiver? Yes   Is the patient taking all medications as directed (includes completed medication regime)? Yes   Does the patient have a primary care provider?  Yes   Has the patient kept scheduled appointments due by today? Yes   Pulse Ox monitoring Intermittent   Pulse Ox device source Patient   O2 Sat comments 90% on 2L O2   O2 Sat: education provided Sat levels, Monitoring frequency, When to seek care   What is the patient's perception of their health status since discharge? Improving   Is the patient able to teach back Heart Failure Zones? Yes   CHF Nursing Interventions Education provided on various zones   CHF Week 2 call completed? Yes   Revoked No further contact(revokes)-requires comment   Graduated/Revoked comments Pt improving   Call end time 1426            Luz H - Registered Nurse

## 2025-05-09 DIAGNOSIS — E66.811 CLASS 1 OBESITY DUE TO EXCESS CALORIES WITH SERIOUS COMORBIDITY AND BODY MASS INDEX (BMI) OF 34.0 TO 34.9 IN ADULT: ICD-10-CM

## 2025-05-09 DIAGNOSIS — E66.09 CLASS 1 OBESITY DUE TO EXCESS CALORIES WITH SERIOUS COMORBIDITY AND BODY MASS INDEX (BMI) OF 34.0 TO 34.9 IN ADULT: ICD-10-CM

## 2025-05-12 RX ORDER — VENLAFAXINE HYDROCHLORIDE 150 MG/1
150 CAPSULE, EXTENDED RELEASE ORAL DAILY
Qty: 90 CAPSULE | Refills: 0 | Status: SHIPPED | OUTPATIENT
Start: 2025-05-12

## 2025-05-20 ENCOUNTER — TELEPHONE (OUTPATIENT)
Dept: FAMILY MEDICINE CLINIC | Facility: CLINIC | Age: 55
End: 2025-05-20
Payer: COMMERCIAL

## 2025-05-21 DIAGNOSIS — I10 ESSENTIAL HYPERTENSION: ICD-10-CM

## 2025-05-21 RX ORDER — DILTIAZEM HYDROCHLORIDE 240 MG/1
240 CAPSULE, EXTENDED RELEASE ORAL DAILY
Qty: 60 CAPSULE | Refills: 0 | Status: SHIPPED | OUTPATIENT
Start: 2025-05-21

## 2025-05-21 NOTE — TELEPHONE ENCOUNTER
"Appt must be completed before FMLA forms are completed. I have not seen her since 04/24/24. Please schedule hospital follow up before 7/2.     In MyChart response: \"But you need to be seen in order for us to fill out paperwork and there is now a fee for all paperwork.\"  "

## 2025-06-02 ENCOUNTER — OFFICE VISIT (OUTPATIENT)
Dept: FAMILY MEDICINE CLINIC | Facility: CLINIC | Age: 55
End: 2025-06-02
Payer: COMMERCIAL

## 2025-06-02 VITALS
WEIGHT: 209 LBS | DIASTOLIC BLOOD PRESSURE: 58 MMHG | TEMPERATURE: 98.1 F | SYSTOLIC BLOOD PRESSURE: 106 MMHG | BODY MASS INDEX: 35.68 KG/M2 | HEART RATE: 72 BPM | RESPIRATION RATE: 18 BRPM | HEIGHT: 64 IN | OXYGEN SATURATION: 96 %

## 2025-06-02 DIAGNOSIS — Z12.31 ENCOUNTER FOR SCREENING MAMMOGRAM FOR MALIGNANT NEOPLASM OF BREAST: ICD-10-CM

## 2025-06-02 DIAGNOSIS — R73.03 PREDIABETES: ICD-10-CM

## 2025-06-02 DIAGNOSIS — G47.33 OSA (OBSTRUCTIVE SLEEP APNEA): ICD-10-CM

## 2025-06-02 DIAGNOSIS — F51.01 PRIMARY INSOMNIA: ICD-10-CM

## 2025-06-02 DIAGNOSIS — D50.9 IRON DEFICIENCY ANEMIA, UNSPECIFIED IRON DEFICIENCY ANEMIA TYPE: ICD-10-CM

## 2025-06-02 DIAGNOSIS — Z09 HOSPITAL DISCHARGE FOLLOW-UP: Primary | ICD-10-CM

## 2025-06-02 RX ORDER — METOPROLOL SUCCINATE 200 MG/1
200 TABLET, EXTENDED RELEASE ORAL DAILY
COMMUNITY
Start: 2025-04-28

## 2025-06-02 RX ORDER — FUROSEMIDE 20 MG/1
20 TABLET ORAL 2 TIMES DAILY
COMMUNITY
Start: 2025-04-28

## 2025-06-02 RX ORDER — LEMBOREXANT 5 MG/1
5 TABLET, FILM COATED ORAL NIGHTLY
Qty: 30 TABLET | Status: CANCELLED | OUTPATIENT
Start: 2025-06-02

## 2025-06-02 NOTE — PROGRESS NOTES
Chief Complaint   hospital f/u-elevated HR    Subjective          Reema Garner presents to [facility]    History of Present Illness  The patient presents for a hospital follow-up. She was admitted to  on 04/20/2025 with a diagnosis of atrial flutter and heart failure. She was discharged on 04/28/2025. Electrical cardioversion was completed during her stay.    She reports experiencing a rapid heart rate and shortness of breath during her recent episode of atrial flutter, which she initially mistook for supraventricular tachycardia (SVT), a condition she has previously experienced and for which she underwent ablation during hospitalization. Despite attempts to alleviate her symptoms through various maneuvers, she was unsuccessful. She describes feeling unwell and notes that her daughter, a nurse, observed a change in her skin color. She expresses relief at having sought medical attention and believes that her condition could have been life-threatening if left untreated.    She has been following up with Dr. Ventura, a cardiologist, and CARLOS Phelan in pulmonology clinice. She had a breath test to check for COPD, which came back negative. She does not have asthma or any other lung conditions. She has to go back next week for a follow-up and another test, possibly an MRI or CAT scan, to ensure her lungs do not look like the picture in the hospital. She still uses oxygen at night and has to remind herself to breathe when she is up and moving around the house because her oxygen levels can drop into the 80s. She has a sleep study coming up and has been referred to the sleep clinic and lung clinic. She has not needed home health care or physical therapy. Pulmonary rehab was mentioned, but her lung specialist did not think she needed it.    She is currently taking mirtazapine 45 mg for sleep but reports that it is not effective in maintaining sleep throughout the  night. She has tried nortriptyline, amitriptyline, Seroquel, Lunesta, zolpidem, and doxepin in the past without success. She has a sleep study scheduled for 06/09/2025. Describes sleep as restless, often waking throughout the night.     She is requesting intermittent leave from work due to her recent hospitalization and ongoing medical appointments. She is concerned about the potential impact on her employment status without FMLA.    She has a history of anemia and was not prescribed iron supplements upon discharge from the hospital.    She has a history of prediabetes.    PAST SURGICAL HISTORY:  Ablation for supraventricular tachycardia (SVT).      The following portions of the patient's history were reviewed and updated as appropriate: allergies, current medications, past family history, past medical history, past social history, past surgical history, and problem list.    Objective      Physical Exam  Vitals and nursing note reviewed.   Constitutional:       Appearance: Normal appearance.   Cardiovascular:      Rate and Rhythm: Normal rate and regular rhythm.      Heart sounds: Normal heart sounds. No murmur heard.  Pulmonary:      Effort: Pulmonary effort is normal.      Breath sounds: Normal breath sounds. No wheezing.   Musculoskeletal:      Cervical back: Neck supple.   Neurological:      Mental Status: She is alert.   Psychiatric:         Mood and Affect: Mood normal.        Physical Exam      Result Review :       Results               Assessment and Plan    Diagnoses and all orders for this visit:    1. Hospital discharge follow-up (Primary)  -     CBC (No Diff)  -     Comprehensive Metabolic Panel  -     Ferritin  -     Iron  -     TSH  -     Hemoglobin A1c    2. Primary insomnia  -     Ambulatory Referral to Sleep Medicine  -     CBC (No Diff)  -     Comprehensive Metabolic Panel  -     Ferritin  -     Iron  -     TSH  -     Hemoglobin A1c    3. FRANKI (obstructive sleep apnea)  -     Ambulatory Referral to  Sleep Medicine  -     CBC (No Diff)  -     Comprehensive Metabolic Panel  -     Ferritin  -     Iron  -     TSH  -     Hemoglobin A1c    4. Iron deficiency anemia, unspecified iron deficiency anemia type  -     CBC (No Diff)  -     Comprehensive Metabolic Panel  -     Ferritin  -     Iron  -     TSH  -     Hemoglobin A1c    5. Prediabetes  -     CBC (No Diff)  -     Comprehensive Metabolic Panel  -     Ferritin  -     Iron  -     TSH  -     Hemoglobin A1c    6. Encounter for screening mammogram for malignant neoplasm of breast  -     CBC (No Diff)  -     Comprehensive Metabolic Panel  -     Ferritin  -     Iron  -     TSH  -     Hemoglobin A1c  -     Mammo Screening Digital Tomosynthesis Bilateral With CAD; Future      Assessment & Plan  1. Atrial Flutter.  - Hospitalized from 04/20/2025 to 04/28/2025 with atrial flutter and heart failure.  - Electrical cardioversion was performed during her stay.  - Advised to continue her current medication regimen.  - Follow-up with cardiologist as scheduled.    2. Heart Failure.  - Experienced significant fluid retention and difficulty breathing during hospitalization.  - Advised to stay compliant with medications.  - Follow-up with cardiologist.  - Repeat chest imaging scheduled to monitor lung condition.    3. Insomnia.  - Experiencing sleep disturbances despite being on mirtazapine 45 mg.  - Will taper off mirtazapine by reducing the dose to 30 mg for two weeks, then to 15 mg for another two weeks, before discontinuing it completely.  - sleep issues may actually be related to untreated FRANKI, since she is waking throughout the night. Will have specialist weigh in on insomnia.    4. Anemia.  - Iron levels were previously low.  - Recheck of iron levels will be conducted today.    5. Prediabetes.  - A1c levels will be reassessed today.    6. Health Maintenance.  - Overdue for a mammogram.  - Order for a mammogram will be placed, advised to schedule it at earliest  convenience.    7. Paperwork Encounter.  - Paperwork will be completed and faxed to employer to ensure job protection.        Follow Up   Return in about 6 months (around 12/2/2025) for Annual physical.    Patient or patient representative verbalized consent for the use of Ambient Listening during the visit with  Guerline Alarcon DO for chart documentation. 6/3/2025  20:15 EDT    Patient was given instructions and counseling regarding her condition or for health maintenance advice. Please see specific information pulled into the AVS if appropriate.

## 2025-06-03 ENCOUNTER — TELEPHONE (OUTPATIENT)
Dept: FAMILY MEDICINE CLINIC | Facility: CLINIC | Age: 55
End: 2025-06-03
Payer: COMMERCIAL

## 2025-06-03 ENCOUNTER — PATIENT MESSAGE (OUTPATIENT)
Dept: FAMILY MEDICINE CLINIC | Facility: CLINIC | Age: 55
End: 2025-06-03
Payer: COMMERCIAL

## 2025-06-03 LAB
ALBUMIN SERPL-MCNC: 4.6 G/DL (ref 3.8–4.9)
ALP SERPL-CCNC: 121 IU/L (ref 44–121)
ALT SERPL-CCNC: 38 IU/L (ref 0–32)
AST SERPL-CCNC: 49 IU/L (ref 0–40)
BILIRUB SERPL-MCNC: 0.3 MG/DL (ref 0–1.2)
BUN SERPL-MCNC: 8 MG/DL (ref 6–24)
BUN/CREAT SERPL: 11 (ref 9–23)
CALCIUM SERPL-MCNC: 9.8 MG/DL (ref 8.7–10.2)
CHLORIDE SERPL-SCNC: 97 MMOL/L (ref 96–106)
CO2 SERPL-SCNC: 30 MMOL/L (ref 20–29)
CREAT SERPL-MCNC: 0.7 MG/DL (ref 0.57–1)
EGFRCR SERPLBLD CKD-EPI 2021: 101 ML/MIN/1.73
ERYTHROCYTE [DISTWIDTH] IN BLOOD BY AUTOMATED COUNT: 17.5 % (ref 11.7–15.4)
FERRITIN SERPL-MCNC: 384 NG/ML (ref 15–150)
GLOBULIN SER CALC-MCNC: 2.5 G/DL (ref 1.5–4.5)
GLUCOSE SERPL-MCNC: 126 MG/DL (ref 70–99)
HBA1C MFR BLD: 5.8 % (ref 4.8–5.6)
HCT VFR BLD AUTO: 43.7 % (ref 34–46.6)
HGB BLD-MCNC: 12.8 G/DL (ref 11.1–15.9)
IRON SERPL-MCNC: 62 UG/DL (ref 27–159)
MCH RBC QN AUTO: 27.1 PG (ref 26.6–33)
MCHC RBC AUTO-ENTMCNC: 29.3 G/DL (ref 31.5–35.7)
MCV RBC AUTO: 93 FL (ref 79–97)
PLATELET # BLD AUTO: 242 X10E3/UL (ref 150–450)
POTASSIUM SERPL-SCNC: 3.6 MMOL/L (ref 3.5–5.2)
PROT SERPL-MCNC: 7.1 G/DL (ref 6–8.5)
RBC # BLD AUTO: 4.72 X10E6/UL (ref 3.77–5.28)
SODIUM SERPL-SCNC: 144 MMOL/L (ref 134–144)
TSH SERPL DL<=0.005 MIU/L-ACNC: 2.71 UIU/ML (ref 0.45–4.5)
WBC # BLD AUTO: 8.8 X10E3/UL (ref 3.4–10.8)

## 2025-06-03 RX ORDER — RAMELTEON 8 MG/1
8 TABLET ORAL NIGHTLY
Qty: 30 TABLET | Refills: 1 | Status: SHIPPED | OUTPATIENT
Start: 2025-06-03

## 2025-06-03 RX ORDER — MIRTAZAPINE 15 MG/1
TABLET, FILM COATED ORAL
Qty: 42 TABLET | Refills: 0 | Status: SHIPPED | OUTPATIENT
Start: 2025-06-03

## 2025-06-06 RX ORDER — ROSUVASTATIN CALCIUM 20 MG/1
20 TABLET, COATED ORAL DAILY
Qty: 90 TABLET | Refills: 3 | Status: SHIPPED | OUTPATIENT
Start: 2025-06-06

## 2025-06-13 ENCOUNTER — PRIOR AUTHORIZATION (OUTPATIENT)
Dept: FAMILY MEDICINE CLINIC | Facility: CLINIC | Age: 55
End: 2025-06-13
Payer: COMMERCIAL

## 2025-06-13 NOTE — TELEPHONE ENCOUNTER
Dexlansoprazole PA (Key: [id number]) Approved  CaseId:[id number];Status:Approved;Review Type:Prior Auth;Coverage Start Date:05/14/2025;Coverage End Date:06/13/2026;

## 2025-06-17 ENCOUNTER — HOSPITAL ENCOUNTER (OUTPATIENT)
Dept: MAMMOGRAPHY | Facility: HOSPITAL | Age: 55
Discharge: HOME | End: 2025-06-17
Admitting: FAMILY MEDICINE
Payer: COMMERCIAL

## 2025-06-17 DIAGNOSIS — Z12.31 ENCOUNTER FOR SCREENING MAMMOGRAM FOR MALIGNANT NEOPLASM OF BREAST: ICD-10-CM

## 2025-06-17 PROCEDURE — 77067 SCR MAMMO BI INCL CAD: CPT

## 2025-06-17 PROCEDURE — 77063 BREAST TOMOSYNTHESIS BI: CPT

## 2025-06-17 RX ORDER — DEXLANSOPRAZOLE 60 MG/1
60 CAPSULE, DELAYED RELEASE ORAL DAILY
Qty: 90 CAPSULE | Refills: 1 | Status: SHIPPED | OUTPATIENT
Start: 2025-06-17

## 2025-06-17 NOTE — TELEPHONE ENCOUNTER
Pt would still like to have this medication, and is requesting this sent to [facility], as [facility] does not carry this anymore

## 2025-06-30 DIAGNOSIS — F51.01 PRIMARY INSOMNIA: ICD-10-CM

## 2025-06-30 RX ORDER — MIRTAZAPINE 45 MG/1
45 TABLET, FILM COATED ORAL
Qty: 90 TABLET | Refills: 3 | OUTPATIENT
Start: 2025-06-30

## 2025-07-07 DIAGNOSIS — F51.01 PRIMARY INSOMNIA: ICD-10-CM

## 2025-07-10 RX ORDER — MIRTAZAPINE 45 MG/1
45 TABLET, FILM COATED ORAL NIGHTLY
Qty: 90 TABLET | Refills: 1 | Status: SHIPPED | OUTPATIENT
Start: 2025-07-10 | End: 2025-07-10 | Stop reason: SDUPTHER

## 2025-07-10 RX ORDER — FUROSEMIDE 20 MG/1
20 TABLET ORAL 2 TIMES DAILY
Qty: 180 TABLET | Refills: 0 | Status: SHIPPED | OUTPATIENT
Start: 2025-07-10

## 2025-07-10 RX ORDER — MIRTAZAPINE 45 MG/1
45 TABLET, FILM COATED ORAL NIGHTLY
Qty: 90 TABLET | Refills: 0 | Status: SHIPPED | OUTPATIENT
Start: 2025-07-10

## 2025-07-11 ENCOUNTER — OFFICE VISIT (OUTPATIENT)
Dept: FAMILY MEDICINE CLINIC | Facility: CLINIC | Age: 55
End: 2025-07-11
Payer: COMMERCIAL

## 2025-07-11 VITALS
TEMPERATURE: 97.1 F | HEART RATE: 70 BPM | DIASTOLIC BLOOD PRESSURE: 68 MMHG | HEIGHT: 64 IN | WEIGHT: 202 LBS | SYSTOLIC BLOOD PRESSURE: 122 MMHG | BODY MASS INDEX: 34.49 KG/M2 | OXYGEN SATURATION: 93 %

## 2025-07-11 DIAGNOSIS — L30.9 DERMATITIS: Primary | ICD-10-CM

## 2025-07-11 PROCEDURE — 99213 OFFICE O/P EST LOW 20 MIN: CPT | Performed by: FAMILY MEDICINE

## 2025-07-11 RX ORDER — PREDNISONE 20 MG/1
TABLET ORAL
Qty: 16 TABLET | Refills: 0 | Status: SHIPPED | OUTPATIENT
Start: 2025-07-11

## 2025-07-11 RX ORDER — PERMETHRIN 50 MG/G
CREAM TOPICAL
Qty: 60 G | Refills: 1 | Status: SHIPPED | OUTPATIENT
Start: 2025-07-11

## 2025-07-11 NOTE — PROGRESS NOTES
Subjective   Reema Garner is a 55 y.o. female.     Rash       She had a rash on her skin  Bumps  Sometimes they itch  Steroids did help a little but came back after steroids completed  Rah on arms, chest, thighs,  Something she feels as she rubs her hand across her arms    The following portions of the patient's history were reviewed and updated as appropriate: allergies, current medications, past family history, past medical history, past social history, past surgical history, and problem list.    Review of Systems   Skin:  Positive for rash.       Objective   Physical Exam  Vitals and nursing note reviewed.   Constitutional:       General: She is not in acute distress.     Appearance: Normal appearance. She is well-developed.   Cardiovascular:      Rate and Rhythm: Normal rate and regular rhythm.      Heart sounds: Normal heart sounds.   Pulmonary:      Effort: Pulmonary effort is normal.      Breath sounds: Normal breath sounds.   Skin:         Neurological:      Mental Status: She is alert and oriented to person, place, and time.   Psychiatric:         Mood and Affect: Mood normal.         Behavior: Behavior normal.         Thought Content: Thought content normal.         Judgment: Judgment normal.         Assessment & Plan   Diagnoses and all orders for this visit:    1. Dermatitis (Primary)  -     predniSONE (DELTASONE) 20 MG tablet; 3 po daily 2 days then 2 po daily 3 days then 1 po daily 3 days then 1/2 po daily 2 days  Dispense: 16 tablet; Refill: 0  -     permethrin (ELIMITE) 5 % cream; Apply from scalp to feet and wash off after 8 hours, repeat in one week  Dispense: 60 g; Refill: 1    Pred taper  Will try permethrin as well.  Pt to call back INB and would set up with derm if rash persists.

## 2025-07-21 DIAGNOSIS — I10 ESSENTIAL HYPERTENSION: ICD-10-CM

## 2025-07-21 RX ORDER — DILTIAZEM HYDROCHLORIDE 240 MG/1
240 CAPSULE, EXTENDED RELEASE ORAL DAILY
Qty: 60 CAPSULE | Refills: 5 | Status: SHIPPED | OUTPATIENT
Start: 2025-07-21

## 2025-07-25 DIAGNOSIS — R25.2 MUSCLE CRAMPS: Primary | ICD-10-CM

## 2025-08-07 DIAGNOSIS — I48.91 ATRIAL FIBRILLATION, UNSPECIFIED TYPE: Primary | ICD-10-CM

## 2025-08-08 ENCOUNTER — TELEPHONE (OUTPATIENT)
Dept: FAMILY MEDICINE CLINIC | Facility: CLINIC | Age: 55
End: 2025-08-08
Payer: COMMERCIAL

## 2025-08-08 RX ORDER — FLUCONAZOLE 150 MG/1
150 TABLET ORAL
Qty: 2 TABLET | Refills: 0 | Status: SHIPPED | OUTPATIENT
Start: 2025-08-08 | End: 2025-08-12